# Patient Record
Sex: FEMALE | Race: WHITE | NOT HISPANIC OR LATINO | Employment: OTHER
[De-identification: names, ages, dates, MRNs, and addresses within clinical notes are randomized per-mention and may not be internally consistent; named-entity substitution may affect disease eponyms.]

---

## 2017-04-26 ENCOUNTER — RECORDS - HEALTHEAST (OUTPATIENT)
Dept: ADMINISTRATIVE | Facility: OTHER | Age: 62
End: 2017-04-26

## 2017-04-26 LAB
HPV_EXT - HISTORICAL: NORMAL
PAP SMEAR - HIM PATIENT REPORTED: NORMAL

## 2017-05-19 ENCOUNTER — RECORDS - HEALTHEAST (OUTPATIENT)
Dept: ADMINISTRATIVE | Facility: OTHER | Age: 62
End: 2017-05-19

## 2017-05-26 ENCOUNTER — RECORDS - HEALTHEAST (OUTPATIENT)
Dept: ADMINISTRATIVE | Facility: OTHER | Age: 62
End: 2017-05-26

## 2017-09-20 ENCOUNTER — RECORDS - HEALTHEAST (OUTPATIENT)
Dept: ADMINISTRATIVE | Facility: OTHER | Age: 62
End: 2017-09-20

## 2017-09-20 LAB
HPV_EXT - HISTORICAL: NORMAL
PAP SMEAR - HIM PATIENT REPORTED: NORMAL

## 2017-10-05 ENCOUNTER — RECORDS - HEALTHEAST (OUTPATIENT)
Dept: ADMINISTRATIVE | Facility: OTHER | Age: 62
End: 2017-10-05

## 2019-01-26 ENCOUNTER — RECORDS - HEALTHEAST (OUTPATIENT)
Dept: ADMINISTRATIVE | Facility: OTHER | Age: 64
End: 2019-01-26

## 2019-01-28 ENCOUNTER — RECORDS - HEALTHEAST (OUTPATIENT)
Dept: ADMINISTRATIVE | Facility: OTHER | Age: 64
End: 2019-01-28

## 2019-04-03 ENCOUNTER — RECORDS - HEALTHEAST (OUTPATIENT)
Dept: ADMINISTRATIVE | Facility: OTHER | Age: 64
End: 2019-04-03

## 2019-04-03 LAB — HBA1C MFR BLD: 5.7 % (ref 0–5.6)

## 2019-04-04 ENCOUNTER — AMBULATORY - HEALTHEAST (OUTPATIENT)
Dept: MULTI SPECIALTY CLINIC | Facility: CLINIC | Age: 64
End: 2019-04-04

## 2019-04-04 LAB
HPV_EXT - HISTORICAL: NORMAL
PAP SMEAR - HIM PATIENT REPORTED: NORMAL

## 2019-04-19 ENCOUNTER — RECORDS - HEALTHEAST (OUTPATIENT)
Dept: ADMINISTRATIVE | Facility: OTHER | Age: 64
End: 2019-04-19

## 2019-04-22 ENCOUNTER — AMBULATORY - HEALTHEAST (OUTPATIENT)
Dept: MULTI SPECIALTY CLINIC | Facility: CLINIC | Age: 64
End: 2019-04-22

## 2019-08-30 ENCOUNTER — RECORDS - HEALTHEAST (OUTPATIENT)
Dept: HEALTH INFORMATION MANAGEMENT | Facility: CLINIC | Age: 64
End: 2019-08-30

## 2019-08-30 ENCOUNTER — OFFICE VISIT - HEALTHEAST (OUTPATIENT)
Dept: FAMILY MEDICINE | Facility: CLINIC | Age: 64
End: 2019-08-30

## 2019-08-30 DIAGNOSIS — M85.88 OSTEOPENIA OF LUMBAR SPINE: ICD-10-CM

## 2019-08-30 DIAGNOSIS — R87.810 PAPANICOLAOU SMEAR OF CERVIX WITH POSITIVE HIGH RISK HUMAN PAPILLOMA VIRUS (HPV) TEST: ICD-10-CM

## 2019-08-30 DIAGNOSIS — Z11.59 NEED FOR HEPATITIS C SCREENING TEST: ICD-10-CM

## 2019-08-30 DIAGNOSIS — I10 ESSENTIAL HYPERTENSION: ICD-10-CM

## 2019-08-30 DIAGNOSIS — Z11.4 SCREENING FOR HIV (HUMAN IMMUNODEFICIENCY VIRUS): ICD-10-CM

## 2019-08-30 LAB — HIV 1+2 AB+HIV1 P24 AG SERPL QL IA: NEGATIVE

## 2019-08-30 ASSESSMENT — MIFFLIN-ST. JEOR: SCORE: 1013.85

## 2019-09-02 LAB — HCV AB SERPL QL IA: NEGATIVE

## 2019-09-03 LAB
25(OH)D3 SERPL-MCNC: 34.3 NG/ML (ref 30–80)
25(OH)D3 SERPL-MCNC: 34.3 NG/ML (ref 30–80)

## 2019-09-13 ENCOUNTER — COMMUNICATION - HEALTHEAST (OUTPATIENT)
Dept: FAMILY MEDICINE | Facility: CLINIC | Age: 64
End: 2019-09-13

## 2019-10-04 ENCOUNTER — RECORDS - HEALTHEAST (OUTPATIENT)
Dept: GENERAL RADIOLOGY | Facility: CLINIC | Age: 64
End: 2019-10-04

## 2019-10-04 ENCOUNTER — OFFICE VISIT - HEALTHEAST (OUTPATIENT)
Dept: FAMILY MEDICINE | Facility: CLINIC | Age: 64
End: 2019-10-04

## 2019-10-04 DIAGNOSIS — S99.921A TOE INJURY, RIGHT, INITIAL ENCOUNTER: ICD-10-CM

## 2019-10-04 DIAGNOSIS — Z12.31 ENCOUNTER FOR SCREENING MAMMOGRAM FOR BREAST CANCER: ICD-10-CM

## 2019-10-04 DIAGNOSIS — S99.921A UNSPECIFIED INJURY OF RIGHT FOOT, INITIAL ENCOUNTER: ICD-10-CM

## 2019-10-04 DIAGNOSIS — S92.501A CLOSED FRACTURE OF PHALANX OF RIGHT FOURTH TOE, INITIAL ENCOUNTER: ICD-10-CM

## 2019-10-04 DIAGNOSIS — I10 ESSENTIAL HYPERTENSION: ICD-10-CM

## 2019-10-04 RX ORDER — CALCIUM CARBONATE 500(1250)
500 TABLET ORAL DAILY
Status: SHIPPED | COMMUNITY
Start: 2008-12-17 | End: 2023-09-21

## 2019-10-04 RX ORDER — OMEGA-3-ACID ETHYL ESTERS 1 G/1
2 CAPSULE, LIQUID FILLED ORAL
Status: SHIPPED | COMMUNITY
Start: 2019-10-04 | End: 2021-07-20

## 2019-10-04 ASSESSMENT — ANXIETY QUESTIONNAIRES
4. TROUBLE RELAXING: NOT AT ALL
2. NOT BEING ABLE TO STOP OR CONTROL WORRYING: NOT AT ALL
IF YOU CHECKED OFF ANY PROBLEMS ON THIS QUESTIONNAIRE, HOW DIFFICULT HAVE THESE PROBLEMS MADE IT FOR YOU TO DO YOUR WORK, TAKE CARE OF THINGS AT HOME, OR GET ALONG WITH OTHER PEOPLE: NOT DIFFICULT AT ALL
7. FEELING AFRAID AS IF SOMETHING AWFUL MIGHT HAPPEN: NOT AT ALL
5. BEING SO RESTLESS THAT IT IS HARD TO SIT STILL: NOT AT ALL
GAD7 TOTAL SCORE: 0
3. WORRYING TOO MUCH ABOUT DIFFERENT THINGS: NOT AT ALL
6. BECOMING EASILY ANNOYED OR IRRITABLE: NOT AT ALL
1. FEELING NERVOUS, ANXIOUS, OR ON EDGE: NOT AT ALL

## 2019-10-04 ASSESSMENT — PATIENT HEALTH QUESTIONNAIRE - PHQ9: SUM OF ALL RESPONSES TO PHQ QUESTIONS 1-9: 0

## 2019-10-04 ASSESSMENT — MIFFLIN-ST. JEOR: SCORE: 1030.3

## 2019-10-07 ENCOUNTER — RECORDS - HEALTHEAST (OUTPATIENT)
Dept: ADMINISTRATIVE | Facility: OTHER | Age: 64
End: 2019-10-07

## 2019-10-08 ENCOUNTER — RECORDS - HEALTHEAST (OUTPATIENT)
Dept: MULTI SPECIALTY CLINIC | Facility: CLINIC | Age: 64
End: 2019-10-08

## 2019-10-21 ENCOUNTER — RECORDS - HEALTHEAST (OUTPATIENT)
Dept: ADMINISTRATIVE | Facility: OTHER | Age: 64
End: 2019-10-21

## 2019-11-18 ENCOUNTER — RECORDS - HEALTHEAST (OUTPATIENT)
Dept: ADMINISTRATIVE | Facility: OTHER | Age: 64
End: 2019-11-18

## 2019-11-25 ENCOUNTER — COMMUNICATION - HEALTHEAST (OUTPATIENT)
Dept: FAMILY MEDICINE | Facility: CLINIC | Age: 64
End: 2019-11-25

## 2019-11-27 ENCOUNTER — HOSPITAL ENCOUNTER (OUTPATIENT)
Dept: MAMMOGRAPHY | Facility: CLINIC | Age: 64
Discharge: HOME | End: 2019-11-27
Attending: FAMILY MEDICINE

## 2019-11-27 ENCOUNTER — RECORDS - HEALTHEAST (OUTPATIENT)
Dept: RADIOLOGY | Facility: CLINIC | Age: 64
End: 2019-11-27

## 2019-11-27 ENCOUNTER — RECORDS - HEALTHEAST (OUTPATIENT)
Dept: ADMINISTRATIVE | Facility: OTHER | Age: 64
End: 2019-11-27

## 2019-11-27 DIAGNOSIS — Z12.31 ENCOUNTER FOR SCREENING MAMMOGRAM FOR BREAST CANCER: ICD-10-CM

## 2020-02-24 ENCOUNTER — COMMUNICATION - HEALTHEAST (OUTPATIENT)
Dept: FAMILY MEDICINE | Facility: CLINIC | Age: 65
End: 2020-02-24

## 2020-02-24 DIAGNOSIS — I10 ESSENTIAL HYPERTENSION: ICD-10-CM

## 2020-02-26 ENCOUNTER — RECORDS - HEALTHEAST (OUTPATIENT)
Dept: ADMINISTRATIVE | Facility: OTHER | Age: 65
End: 2020-02-26

## 2020-04-06 ENCOUNTER — COMMUNICATION - HEALTHEAST (OUTPATIENT)
Dept: FAMILY MEDICINE | Facility: CLINIC | Age: 65
End: 2020-04-06

## 2020-04-06 DIAGNOSIS — I10 ESSENTIAL HYPERTENSION: ICD-10-CM

## 2020-08-17 ENCOUNTER — COMMUNICATION - HEALTHEAST (OUTPATIENT)
Dept: FAMILY MEDICINE | Facility: CLINIC | Age: 65
End: 2020-08-17

## 2020-08-17 DIAGNOSIS — I10 ESSENTIAL HYPERTENSION: ICD-10-CM

## 2020-09-25 ENCOUNTER — OFFICE VISIT - HEALTHEAST (OUTPATIENT)
Dept: FAMILY MEDICINE | Facility: CLINIC | Age: 65
End: 2020-09-25

## 2020-09-25 DIAGNOSIS — Z12.4 PAPANICOLAOU SMEAR FOR CERVICAL CANCER SCREENING: ICD-10-CM

## 2020-09-25 DIAGNOSIS — Z00.00 WELCOME TO MEDICARE PREVENTIVE VISIT: ICD-10-CM

## 2020-09-25 DIAGNOSIS — Z23 IMMUNIZATION DUE: ICD-10-CM

## 2020-09-25 DIAGNOSIS — B97.7 HIGH RISK HPV INFECTION: ICD-10-CM

## 2020-09-25 ASSESSMENT — MIFFLIN-ST. JEOR: SCORE: 1093.81

## 2020-09-28 LAB
HPV SOURCE: ABNORMAL
HUMAN PAPILLOMA VIRUS 16 DNA: NEGATIVE
HUMAN PAPILLOMA VIRUS 18 DNA: NEGATIVE
HUMAN PAPILLOMA VIRUS FINAL DIAGNOSIS: ABNORMAL
HUMAN PAPILLOMA VIRUS OTHER HR: POSITIVE
SPECIMEN DESCRIPTION: ABNORMAL

## 2020-09-30 LAB
ATRIAL RATE - MUSE: 56 BPM
ATRIAL RATE - MUSE: 61 BPM
DIASTOLIC BLOOD PRESSURE - MUSE: NORMAL
DIASTOLIC BLOOD PRESSURE - MUSE: NORMAL
INTERPRETATION ECG - MUSE: NORMAL
INTERPRETATION ECG - MUSE: NORMAL
P AXIS - MUSE: 47 DEGREES
P AXIS - MUSE: 58 DEGREES
PR INTERVAL - MUSE: 132 MS
PR INTERVAL - MUSE: 156 MS
QRS DURATION - MUSE: 86 MS
QRS DURATION - MUSE: 88 MS
QT - MUSE: 414 MS
QT - MUSE: 416 MS
QTC - MUSE: 399 MS
QTC - MUSE: 418 MS
R AXIS - MUSE: 2 DEGREES
R AXIS - MUSE: 33 DEGREES
SYSTOLIC BLOOD PRESSURE - MUSE: NORMAL
SYSTOLIC BLOOD PRESSURE - MUSE: NORMAL
T AXIS - MUSE: 30 DEGREES
T AXIS - MUSE: 35 DEGREES
VENTRICULAR RATE- MUSE: 56 BPM
VENTRICULAR RATE- MUSE: 61 BPM

## 2020-10-08 ENCOUNTER — COMMUNICATION - HEALTHEAST (OUTPATIENT)
Dept: ADMINISTRATIVE | Facility: CLINIC | Age: 65
End: 2020-10-08

## 2020-10-08 ENCOUNTER — COMMUNICATION - HEALTHEAST (OUTPATIENT)
Dept: FAMILY MEDICINE | Facility: CLINIC | Age: 65
End: 2020-10-08

## 2020-10-08 ENCOUNTER — AMBULATORY - HEALTHEAST (OUTPATIENT)
Dept: FAMILY MEDICINE | Facility: CLINIC | Age: 65
End: 2020-10-08

## 2020-10-08 DIAGNOSIS — R87.810 CERVICAL HIGH RISK HUMAN PAPILLOMAVIRUS (HPV) DNA TEST POSITIVE: ICD-10-CM

## 2020-10-08 DIAGNOSIS — R87.810 CERVICAL HIGH RISK HPV (HUMAN PAPILLOMAVIRUS) TEST POSITIVE: ICD-10-CM

## 2020-10-15 ENCOUNTER — AMBULATORY - HEALTHEAST (OUTPATIENT)
Dept: OBGYN | Facility: CLINIC | Age: 65
End: 2020-10-15

## 2020-10-15 DIAGNOSIS — Z12.4 ENCOUNTER FOR SCREENING FOR MALIGNANT NEOPLASM OF CERVIX: ICD-10-CM

## 2020-10-15 DIAGNOSIS — R87.615 UNSATISFACTORY CERVICAL PAPANICOLAOU SMEAR: ICD-10-CM

## 2020-10-15 DIAGNOSIS — R87.810 CERVICAL HIGH RISK HPV (HUMAN PAPILLOMAVIRUS) TEST POSITIVE: ICD-10-CM

## 2020-10-23 LAB
BKR LAB AP ABNORMAL BLEEDING: NO
BKR LAB AP BIRTH CONTROL/HORMONES: NORMAL
BKR LAB AP CERVICAL APPEARANCE: NORMAL
BKR LAB AP GYN ADEQUACY: NORMAL
BKR LAB AP GYN INTERPRETATION: NORMAL
BKR LAB AP HPV REFLEX: NORMAL
BKR LAB AP LMP: NORMAL
BKR LAB AP PATIENT STATUS: NORMAL
BKR LAB AP PREVIOUS ABNORMAL: 2017
BKR LAB AP PREVIOUS NORMAL: NORMAL
HIGH RISK?: NO
PATH REPORT.COMMENTS IMP SPEC: NORMAL
RESULT FLAG (HE HISTORICAL CONVERSION): NORMAL

## 2020-10-26 ENCOUNTER — COMMUNICATION - HEALTHEAST (OUTPATIENT)
Dept: OBGYN | Facility: CLINIC | Age: 65
End: 2020-10-26

## 2020-10-26 DIAGNOSIS — R87.810 CERVICAL HIGH RISK HPV (HUMAN PAPILLOMAVIRUS) TEST POSITIVE: ICD-10-CM

## 2020-12-09 ENCOUNTER — HOSPITAL ENCOUNTER (OUTPATIENT)
Dept: MAMMOGRAPHY | Facility: CLINIC | Age: 65
Discharge: HOME | End: 2020-12-09
Attending: FAMILY MEDICINE

## 2020-12-09 DIAGNOSIS — Z12.31 VISIT FOR SCREENING MAMMOGRAM: ICD-10-CM

## 2021-02-15 ENCOUNTER — RECORDS - HEALTHEAST (OUTPATIENT)
Dept: ADMINISTRATIVE | Facility: OTHER | Age: 66
End: 2021-02-15

## 2021-02-15 ENCOUNTER — COMMUNICATION - HEALTHEAST (OUTPATIENT)
Dept: INTERNAL MEDICINE | Facility: CLINIC | Age: 66
End: 2021-02-15

## 2021-02-15 ENCOUNTER — COMMUNICATION - HEALTHEAST (OUTPATIENT)
Dept: FAMILY MEDICINE | Facility: CLINIC | Age: 66
End: 2021-02-15

## 2021-02-15 DIAGNOSIS — I10 ESSENTIAL HYPERTENSION: ICD-10-CM

## 2021-03-04 ENCOUNTER — AMBULATORY - HEALTHEAST (OUTPATIENT)
Dept: LAB | Facility: CLINIC | Age: 66
End: 2021-03-04

## 2021-03-04 DIAGNOSIS — I10 ESSENTIAL HYPERTENSION: ICD-10-CM

## 2021-03-04 LAB
ANION GAP SERPL CALCULATED.3IONS-SCNC: 12 MMOL/L (ref 5–18)
BUN SERPL-MCNC: 25 MG/DL (ref 8–22)
CALCIUM SERPL-MCNC: 9.1 MG/DL (ref 8.5–10.5)
CHLORIDE BLD-SCNC: 106 MMOL/L (ref 98–107)
CO2 SERPL-SCNC: 24 MMOL/L (ref 22–31)
CREAT SERPL-MCNC: 0.91 MG/DL (ref 0.6–1.1)
GFR SERPL CREATININE-BSD FRML MDRD: >60 ML/MIN/1.73M2
GLUCOSE BLD-MCNC: 106 MG/DL (ref 70–125)
POTASSIUM BLD-SCNC: 4.3 MMOL/L (ref 3.5–5)
SODIUM SERPL-SCNC: 142 MMOL/L (ref 136–145)

## 2021-03-08 ENCOUNTER — COMMUNICATION - HEALTHEAST (OUTPATIENT)
Dept: FAMILY MEDICINE | Facility: CLINIC | Age: 66
End: 2021-03-08

## 2021-03-12 ENCOUNTER — COMMUNICATION - HEALTHEAST (OUTPATIENT)
Dept: INTERNAL MEDICINE | Facility: CLINIC | Age: 66
End: 2021-03-12

## 2021-03-12 DIAGNOSIS — I10 ESSENTIAL HYPERTENSION: ICD-10-CM

## 2021-03-12 RX ORDER — LISINOPRIL 20 MG/1
TABLET ORAL
Qty: 90 TABLET | Refills: 2 | Status: SHIPPED | OUTPATIENT
Start: 2021-03-12 | End: 2022-03-17

## 2021-05-26 ASSESSMENT — PATIENT HEALTH QUESTIONNAIRE - PHQ9: SUM OF ALL RESPONSES TO PHQ QUESTIONS 1-9: 0

## 2021-05-28 ASSESSMENT — ANXIETY QUESTIONNAIRES: GAD7 TOTAL SCORE: 0

## 2021-05-31 NOTE — PATIENT INSTRUCTIONS - HE
Shingles vaccine at the pharmacy    Mammogram due in November    I will ask out ob-gyn about whether you need another colposcopy

## 2021-06-02 NOTE — PROGRESS NOTES
Assessment and Plan    1. Toe injury, right, initial encounter  - XR Toe Right 2 or More VWS; Future  I see that she has broken her right fourth proximal phalanx.     2. Closed fracture of phalanx of right fourth toe, initial encounter   I did not see that this went through the joint and advised buddy tapping and hard soled shoes.  Xray later came back noting fracture through MCP joint - I called her back and advised her to be seen at [facility]    3. Essential hypertension  Well controlled on:   - lisinopril (PRINIVIL,ZESTRIL) 20 MG tablet; Take 1 tablet (20 mg total) by mouth daily.  Dispense: 90 tablet; Refill: 1    4. Encounter for screening mammogram for breast cancer  - Mammo Screening Bilateral; Future    Return in about 6 months (around 4/4/2020) for Annual physical, or earlier as needed.    Tasha Moore MD     -------------------------------------------    Chief Complaint   Patient presents with     Toe Injury     Right toe. Patient stubbed toe x 2 days.      Chetna hooked her right 4th toe on a table leg, with immediate onset of swelling and bruising - she assumes she has broken it and is buddy taping - which helps but does not eliminate her pain.  Chetna says that the irony was that shortly before this she and her sister were playing a joke on their brother using the walking boot Chetna had from previous fracture, making him think her younger sister had suffered a recent fracture    She is going on a trip to the [place] next week - this has been planned for a long time - she hopes she will still be able to participate. Curling toes hurts, walking hurts      Hypertension - doesn't need refill of medication until she gets back. She denies chest pains, palpitations or dyspnea  BP Readings from Last 3 Encounters:   10/04/19 128/70   08/30/19 110/70        History Anxiety /depression  No longer takes medications and her questionnaires are normal - I will take this off of her Health Maintenance list    Little  interest or pleasure in doing things: Not at all  Feeling down, depressed, or hopeless: Not at all  Trouble falling or staying asleep, or sleeping too much: Not at all  Feeling tired or having little energy: Not at all  Poor appetite or overeating: Not at all  Feeling bad about yourself - or that you are a failure or have let yourself or your family down: Not at all  Trouble concentrating on things, such as reading the newspaper or watching television: Not at all  Moving or speaking so slowly that other people could have noticed. Or the opposite - being so fidgety or restless that you have been moving around a lot more than usual: Not at all  Thoughts that you would be better off dead, or of hurting yourself in some way: Not at all  PHQ-9 Total Score: 0  If you checked off any problems, how difficult have these problems made it for you to do your work, take care of things at home, or get along with other people?: Not difficult at all    SHERRY 7 Total Score: 0 (10/4/2019  9:00 AM)            Mammogram done    Current Outpatient Medications on File Prior to Visit   Medication Sig Dispense Refill     calcium, as carbonate, (OS-NIESHA) 500 mg calcium (1,250 mg) tablet Take 500 mg by mouth daily.       MULTIVITAMIN ORAL Take by mouth.       omega-3 acid ethyl esters (LOVAZA) 1 gram capsule Take 2 g by mouth.       No current facility-administered medications on file prior to visit.          Health Maintenance Due   Topic Date Due     MAMMOGRAM  1955     COLONOSCOPY  06/02/2004     ZOSTER VACCINES (2 of 3) 12/26/2014     MEDICARE ANNUAL WELLNESS VISIT  06/02/2019     DXA SCAN  06/02/2019       Health Maintenance reviewed   - mammogram ordered today     - in CareEverywhere :   - DXA 4/22/19 - osteopenia of spine   - Colonoscopy 7/31/12   ABOVE sent to abstract    The history section was last reviewed by Lesa Le CMA on Oct 4, 2019.    Social History     Tobacco Use   Smoking Status Former Smoker     Packs/day:  0.10     Start date: 1/1/1978   Smokeless Tobacco Never Used       Social History     Substance and Sexual Activity   Alcohol Use Yes     Alcohol/week: 4.0 standard drinks     Types: 4 Glasses of wine per week     Frequency: 2-3 times a week     Drinks per session: 1 or 2         Vitals:    10/04/19 0853   BP: 128/70   Pulse: 88     Body mass index is 23.24 kg/m .     EXAM:    General appearance - alert, well appearing, and in no distress  Mental status - normal mood, behavior, speech, dress, motor activity, and thought processes  Neck - supple, no significant adenopathy, carotids upstroke normal bilaterally, no bruits  Chest - clear to auscultation, no wheezes, rales or rhonchi, symmetric air entry  Heart - normal rate, regular rhythm, normal S1, S2, no murmurs, rubs, clicks or gallops  Musculoskeletal - slight swelling of right 4th phalanx, tenderness with movement of MCP and over proximal phalanx to palpation; bruising has drifted into the top of her foot  Extremities - peripheral pulses normal

## 2021-06-03 VITALS
DIASTOLIC BLOOD PRESSURE: 70 MMHG | WEIGHT: 123 LBS | HEIGHT: 61 IN | HEART RATE: 88 BPM | SYSTOLIC BLOOD PRESSURE: 128 MMHG | BODY MASS INDEX: 23.22 KG/M2

## 2021-06-03 VITALS — HEIGHT: 61 IN | WEIGHT: 118.5 LBS | BODY MASS INDEX: 22.37 KG/M2

## 2021-06-04 VITALS
SYSTOLIC BLOOD PRESSURE: 122 MMHG | OXYGEN SATURATION: 98 % | DIASTOLIC BLOOD PRESSURE: 74 MMHG | HEART RATE: 70 BPM | WEIGHT: 132 LBS | BODY MASS INDEX: 23.38 KG/M2

## 2021-06-04 VITALS
HEIGHT: 63 IN | WEIGHT: 130 LBS | DIASTOLIC BLOOD PRESSURE: 78 MMHG | BODY MASS INDEX: 23.04 KG/M2 | SYSTOLIC BLOOD PRESSURE: 116 MMHG | OXYGEN SATURATION: 96 % | HEART RATE: 60 BPM | TEMPERATURE: 98.9 F

## 2021-06-06 NOTE — TELEPHONE ENCOUNTER
Refill NOT Given    Refill given per Policy, patient informed they are overdue for Labs   OV 10/4/19    Denisa Benz, [facility] Connection Triage/Med Refill 2/25/2020    Requested Prescriptions   Pending Prescriptions Disp Refills     lisinopriL (PRINIVIL,ZESTRIL) 20 MG tablet [Pharmacy Med Name: LISINOPRIL 20 MG Tablet] 90 tablet 1     Sig: TAKE 1 TABLET EVERY DAY       Ace Inhibitors Refill Protocol Failed - 2/24/2020  1:35 PM        Failed - Serum Potassium in past 12 months     No results found for: LN-POTASSIUM          Failed - Serum Creatinine in past 12 months     No results found for: CREATININE          Passed - PCP or prescribing provider visit in past 12 months       Last office visit with prescriber/PCP: 10/4/2019 Tasha Moore MD OR same dept: 10/4/2019 Tasha Moore MD OR same specialty: 10/4/2019 Tasha Moore MD  Last physical: Visit date not found Last MTM visit: Visit date not found   Next visit within 3 mo: Visit date not found  Next physical within 3 mo: Visit date not found  Prescriber OR PCP: Tasha Moore MD  Last diagnosis associated with med order: 1. Essential hypertension  - lisinopriL (PRINIVIL,ZESTRIL) 20 MG tablet [Pharmacy Med Name: LISINOPRIL 20 MG Tablet]; TAKE 1 TABLET EVERY DAY  Dispense: 90 tablet; Refill: 1    If protocol passes may refill for 12 months if within 3 months of last provider visit (or a total of 15 months).             Passed - Blood pressure filed in past 12 months     BP Readings from Last 1 Encounters:   10/04/19 128/70

## 2021-06-07 NOTE — TELEPHONE ENCOUNTER
Left message to call back for: Patient   Information to relay to patient:  Please schedule a annual exam for after July 6th.    BR, CMA

## 2021-06-07 NOTE — TELEPHONE ENCOUNTER
Last BMP normal 1/26/19 ad [facility]    Last visit 8/30/19    I will refill 3 months - please call her to  schedule annual exam and fasting labs in July

## 2021-06-07 NOTE — TELEPHONE ENCOUNTER
RN cannot approve Refill Request    RN can NOT refill this medication Protocol failed and NO refill given. +    Agustina Wolf, [facility] Connection Triage/Med Refill 4/7/2020    Requested Prescriptions   Pending Prescriptions Disp Refills     lisinopriL (PRINIVIL,ZESTRIL) 20 MG tablet [Pharmacy Med Name: LISINOPRIL 20 MG Tablet] 60 tablet 0     Sig: TAKE 1 TABLET EVERY DAY (DUE FOR LABS AND ANNUAL IN APRIL)       Ace Inhibitors Refill Protocol Failed - 4/6/2020  7:09 PM        Failed - Serum Potassium in past 12 months     No results found for: LN-POTASSIUM          Failed - Serum Creatinine in past 12 months     No results found for: CREATININE          Passed - PCP or prescribing provider visit in past 12 months       Last office visit with prescriber/PCP: 10/4/2019 Tasha Moore MD OR same dept: 10/4/2019 Tasha Moore MD OR same specialty: 10/4/2019 Tasha Moore MD  Last physical: Visit date not found Last MTM visit: Visit date not found   Next visit within 3 mo: Visit date not found  Next physical within 3 mo: Visit date not found  Prescriber OR PCP: Tasha Moore MD  Last diagnosis associated with med order: 1. Essential hypertension  - lisinopriL (PRINIVIL,ZESTRIL) 20 MG tablet [Pharmacy Med Name: LISINOPRIL 20 MG Tablet]; TAKE 1 TABLET EVERY DAY (DUE FOR LABS AND ANNUAL IN APRIL)  Dispense: 60 tablet; Refill: 0    If protocol passes may refill for 12 months if within 3 months of last provider visit (or a total of 15 months).             Passed - Blood pressure filed in past 12 months     BP Readings from Last 1 Encounters:   10/04/19 128/70

## 2021-06-10 NOTE — TELEPHONE ENCOUNTER
RN cannot approve Refill Request    RN can NOT refill this medication Protocol failed and NO refill given. Last office visit: 10/4/2019 Tasha Moore MD Last Physical: Visit date not found Last MTM visit: Visit date not found Last visit same specialty: 10/4/2019 Tasha Moore MD.  Next visit within 3 mo: Visit date not found  Next physical within 3 mo: Visit date not found      Agustina Wolf, [facility] Connection Triage/Med Refill 8/18/2020    Requested Prescriptions   Pending Prescriptions Disp Refills     lisinopriL (PRINIVIL,ZESTRIL) 20 MG tablet [Pharmacy Med Name: LISINOPRIL 20 MG Tablet] 90 tablet 0     Sig: TAKE 1 TABLET EVERY DAY (DUE FOR LABS AND ANNUAL IN APRIL)       Ace Inhibitors Refill Protocol Failed - 8/17/2020  7:06 PM        Failed - Serum Potassium in past 12 months     No results found for: LN-POTASSIUM          Failed - Serum Creatinine in past 12 months     No results found for: CREATININE          Passed - PCP or prescribing provider visit in past 12 months       Last office visit with prescriber/PCP: 10/4/2019 Tasha Moore MD OR same dept: 10/4/2019 Tasha Moore MD OR same specialty: 10/4/2019 Tasha Moore MD  Last physical: Visit date not found Last MTM visit: Visit date not found   Next visit within 3 mo: Visit date not found  Next physical within 3 mo: Visit date not found  Prescriber OR PCP: Tahsa Moore MD  Last diagnosis associated with med order: 1. Essential hypertension  - lisinopriL (PRINIVIL,ZESTRIL) 20 MG tablet [Pharmacy Med Name: LISINOPRIL 20 MG Tablet]; TAKE 1 TABLET EVERY DAY (DUE FOR LABS AND ANNUAL IN APRIL)  Dispense: 90 tablet; Refill: 0    If protocol passes may refill for 12 months if within 3 months of last provider visit (or a total of 15 months).             Passed - Blood pressure filed in past 12 months     BP Readings from Last 1 Encounters:   10/04/19 128/70

## 2021-06-11 NOTE — PROGRESS NOTES
"  Assessment and Plan:     1. Welcome to Medicare preventive visit   - Generally well woman  - Electrocardiogram Perform and Read    2. High risk HPV infection/3. Papanicolaou smear for cervical cancer screening  - Gynecologic Cytology (PAP Smear)    4. Immunization due  - Pneumococcal polysaccharide vaccine 23-valent 1 yo or older, subq/IM  - Influenza,Quad,High Dose,PF 65 YR+        The patient's current medical problems were reviewed.    The following health maintenance schedule was reviewed with the patient and provided in printed form in the after visit summary:   Health Maintenance   Topic Date Due     LIPID  06/02/1999     DXA SCAN  06/02/2019     ZOSTER VACCINES (3 of 3) 10/27/2020     MEDICARE ANNUAL WELLNESS VISIT  09/25/2021     FALL RISK ASSESSMENT  09/25/2021     MAMMOGRAM  11/27/2021     TD 18+ HE  06/27/2022     COLORECTAL CANCER SCREENING  07/31/2022     ADVANCE CARE PLANNING  08/30/2024     HEPATITIS C SCREENING  Completed     PNEUMOCOCCAL IMMUNIZATION 65+ LOW/MEDIUM RISK  Completed     INFLUENZA VACCINE RULE BASED  Completed     HEPATITIS B VACCINES  Aged Out        Subjective:   Chief Complaint: Chetna Villanueva is an 65 y.o. female here for a Welcome to Medicare visit.   HPI:     How are you doing during this  CoVid19 pandemic?   Walking in neighborhood doesn't feel comfortable. Chetna lives in apartment - does walk around [place] with an apartment friend; she also goes with daughter.  She is \"staying at home reading books and gaining weight.\" Also , thinking bout different places where she can exercise during  the winter - bought snowshoes    Wt Readings from Last 3 Encounters:   09/25/20 130 lb (59 kg)   10/04/19 123 lb (55.8 kg)   08/30/19 118 lb 8 oz (53.8 kg)       History of back pain last year - got prednisone dose back - that plus PT worked - no recent episode    Preventive     - last DXA 4/22/19 at [facility] - not link in Health Maintenance  - I will send review  Due next year bone " density - link file   - lipids done 4/3/19 - normal    Review of Systems:   Constitutional: negative for recent illness or change in weight  Eyes: negative for irritation and vision change  Ears, nose, mouth, throat, and face: negative for nasal congestion and sore throat  Respiratory: negative for cough and dyspnea on exertion  Cardiovascular: negative for chest pain and palpitations  Gastrointestinal: negative for abdominal pain and change in bowel habits  Genitourinary:negative for dysuria, frequency and hesitancy  Integument/breast: negative for rash  Hematologic/lymphatic: negative for bleeding and easy bruising  Musculoskeletal:negative for arthralgias and myalgias  Neurological: negative for dizziness, headaches and paresthesia      Patient Care Team:  Tasha Moore MD as PCP - General (Family Medicine)  Tasha Moore MD as Assigned PCP     Patient Active Problem List   Diagnosis     Essential hypertension     Osteopenia     Past Medical History:   Diagnosis Date     Depression      Fracture of fourth toe, right, closed 10/04/2019    proixal phalanx, with plantar displacement     Thoracic compression fracture (H)       Past Surgical History:   Procedure Laterality Date     BREAST CYST ASPIRATION       WISDOM TOOTH EXTRACTION  1972      Family History   Problem Relation Age of Onset     Diabetes type II Mother         on insulin     Dementia Mother         falls, in assisted living     Hypertension Mother      Hypertension Father      Heart attack Father      Coronary Stenting Father      Hypertension Sister         normal stress test     No Medical Problems Brother      No Medical Problems Brother      Brain cancer Sister       Social History     Socioeconomic History     Marital status: Single     Spouse name: Not on file     Number of children: Not on file     Years of education: Not on file     Highest education level: Not on file   Occupational History     Not on file   Social Needs     Financial  "resource strain: Not on file     Food insecurity     Worry: Not on file     Inability: Not on file     Transportation needs     Medical: Not on file     Non-medical: Not on file   Tobacco Use     Smoking status: Former Smoker     Packs/day: 0.10     Start date: 1/1/1978     Smokeless tobacco: Never Used   Substance and Sexual Activity     Alcohol use: Yes     Alcohol/week: 4.0 standard drinks     Types: 4 Glasses of wine per week     Frequency: 2-3 times a week     Drinks per session: 1 or 2     Drug use: Never     Sexual activity: Not Currently     Partners: Male   Lifestyle     Physical activity     Days per week: Not on file     Minutes per session: Not on file     Stress: Not on file   Relationships     Social connections     Talks on phone: Not on file     Gets together: Not on file     Attends Hoahaoism service: Not on file     Active member of club or organization: Not on file     Attends meetings of clubs or organizations: Not on file     Relationship status: Not on file     Intimate partner violence     Fear of current or ex partner: Not on file     Emotionally abused: Not on file     Physically abused: Not on file     Forced sexual activity: Not on file   Other Topics Concern     Not on file   Social History Narrative     Not on file       Current Outpatient Medications   Medication Sig Dispense Refill     calcium, as carbonate, (OS-NIESHA) 500 mg calcium (1,250 mg) tablet Take 500 mg by mouth daily.       lisinopriL (PRINIVIL,ZESTRIL) 20 MG tablet TAKE 1 TABLET EVERY DAY (DUE FOR LABS AND ANNUAL IN APRIL) 31 tablet 0     MULTIVITAMIN ORAL Take by mouth.       omega-3 acid ethyl esters (LOVAZA) 1 gram capsule Take 2 g by mouth.       No current facility-administered medications for this visit.       Objective:   Vital Signs:   Visit Vitals  /78 (Patient Site: Left Arm, Patient Position: Sitting, Cuff Size: Adult Regular)   Pulse 60   Temp 98.9  F (37.2  C) (Oral)   Ht 5' 3\" (1.6 m)   Wt 130 lb (59 kg) "   SpO2 96%   BMI 23.03 kg/m         EKG:  Normal sinus rhythm   Normal ECG   When compared with ECG of 25-SEP-2020 11:26,   Criteria for Lateral infarct are no longer Present   Nonspecific T wave abnormality, improved in Inferior leads     VisionScreening: just had vision exam elsewhere  No exam data present     PHYSICAL EXAM  General appearance - alert, well appearing, and in no distress  Mental status - normal mood, behavior, speech, dress, motor activity, and thought processes  Eyes - pupils equal and reactive, extraocular eye movements intact, funduscopic exam normal, discs flat and sharp  Ears - bilateral TM's and external ear canals normal  Mouth - mucous membranes moist, pharynx normal without lesions  Neck - supple, no significant adenopathy, carotids upstroke normal bilaterally, no bruits, thyroid exam: thyroid is normal in size without nodules or tenderness  Chest - clear to auscultation, no wheezes, rales or rhonchi, symmetric air entry  Heart - normal rate, regular rhythm, normal S1, S2, no murmurs, rubs, clicks or gallops  Abdomen - soft, nontender, nondistended, no masses or organomegaly  Breasts - breasts appear normal, no suspicious masses, no skin or nipple changes or axillary nodes  Neurological - alert, oriented, normal speech, no focal findings or movement disorder noted, DTR's normal and symmetric  Extremities - peripheral pulses normal, no pedal edema, no clubbing or cyanosis  Skin - no rashes or worrisome lesions      Assessment Results 9/25/2020   Activities of Daily Living No help needed   Instrumental Activities of Daily Living No help needed   Mini Cog Total Score 5   Some recent data might be hidden     A Mini Cog score of 0-2 suggests the possibility of dementia, score of 3-5 suggests no dementia    Identified Health Risks:     Information regarding advance directives (living [place]), including where she can download the appropriate form, was provided to the patient via the AVS.

## 2021-06-12 NOTE — PROGRESS NOTES
3/29/10 NIL  4/9/13 NIL  4/6/16 NIL, +HR HPV (not 16/18)  4/26/17 unsatisfactory pap, +HR HPV (not 16/18)  9/20/17 LSIL, +HR HPV (not 16/18)  10/19/17 colpo ECC neg, bx neg but suggestive of HPV effect  4/4/19 unsatisfactory pap, +HR HPV (not 16/18)  9/25/20 unsatisfactory pap, +HR HPV (not 16/18). Plan: colposcopy with Dr Johnson, due before 12/25/20  10/8/20 pt notified  10/15/20 colp visually normal. NIL, +HR HPV (not 16/18). Plan: cotest in 1 year

## 2021-06-12 NOTE — TELEPHONE ENCOUNTER
Pls ask Dr Moore to yasmeen pt and explain her pap results and the next step. Pt was called to schedule a [place] and had not been informed.

## 2021-06-12 NOTE — TELEPHONE ENCOUNTER
Pap tracking nurse to call pt to inform of results and colposcopy recommendation.    Morena Salas RN BSN, Pap Tracking

## 2021-06-12 NOTE — TELEPHONE ENCOUNTER
Ref Range & Units 13d ago   HPV Source  Sure[facility]    HPV16 DNA NEG Negative    HPV18 DNA NEG Negative    Other HR HPV NEG PositiveAbnormal     Final Diagnosis  SEE NOTES

## 2021-06-12 NOTE — PROGRESS NOTES
3/29/10 NIL  4/9/13 NIL  4/6/16 NIL, +HR HPV (not 16/18)  4/26/17 unsatisfactory pap, +HR HPV (not 16/18)  9/20/17 LSIL, +HR HPV (not 16/18)  10/19/17 colpo ECC neg, bx neg but suggestive of HPV effect  4/4/19 unsatisfactory pap, +HR HPV (not 16/18)  9/25/20 unsatisfactory pap, +HR HPV (not 16/18). Plan: colposcopy with Dr Johnson, due before 12/25/20

## 2021-06-12 NOTE — PROGRESS NOTES
Colposcopy Procedure Note    Indications: Pap smear on 9/25/2020 showed scant cellularity with +other HRHPV.  Pertinent past history includes LSIL in 2017.  Colposcopy biopsy at that time was consistent with HPV effect on biopsy at 6:00.  She has had other unsatisfactory Paps in the recent past.  She thinks she had something like a cone biopsy in 1983 or 1984, but she doesn't remember details.    Procedure Details   /74 (Patient Site: Right Arm, Patient Position: Sitting, Cuff Size: Adult Regular)   Pulse 70   Wt 132 lb (59.9 kg)   Body mass index is 23.38 kg/m .    The reason for procedure is explained, and informed consent is obtained.      Speculum is placed in the vagina and visualization of cervix is achieved.  Pap smear was collected.  The cervix and vagina both appear atrophic. The cervix is swabbed with 3% acetic acid solution. Transformation zone is easily seen.  Green filter is applied with no abnormal vessels seen.  Acetowhite epithelium is not seen.  ECC is not done.  Adequate colposcopy.  The patient tolerated the procedure well.    Impression: normal atrophic cervix    Plan: Post procedure instructions are reviewed.  The role of HPV in causing cervical pap smear abnormalities, dysplasia, and cancer is reviewed with the patient.  We discussed the role of the immune system and ways to keep it strong.  She was counseled the likely recommendation will be to return to Dr Moore for a Pap with HPV testing in a year.

## 2021-06-15 NOTE — TELEPHONE ENCOUNTER
Refill Approved    Rx renewed per Medication Renewal Policy. Medication was last renewed on 2/15/21.    Darvin Parnell, [facility] [place] Connection Triage/Med Refill 3/12/2021     Requested Prescriptions   Pending Prescriptions Disp Refills     lisinopriL (PRINIVIL,ZESTRIL) 20 MG tablet [Pharmacy Med Name: LISINOPRIL 20 MG TABLET] 31 tablet 0     Sig: TAKE 1 TABLET EVERY DAY (DUE FOR LABS)       Ace Inhibitors Refill Protocol Passed - 3/12/2021 12:10 AM        Passed - PCP or prescribing provider visit in past 12 months       Last office visit with prescriber/PCP: 10/4/2019 Tasha Moore MD OR same dept: Visit date not found OR same specialty: Visit date not found  Last physical: 9/25/2020 Last MTM visit: Visit date not found   Next visit within 3 mo: Visit date not found  Next physical within 3 mo: Visit date not found  Prescriber OR PCP: Tasha Moore MD  Last diagnosis associated with med order: 1. Essential hypertension  - lisinopriL (PRINIVIL,ZESTRIL) 20 MG tablet [Pharmacy Med Name: LISINOPRIL 20 MG TABLET]; TAKE 1 TABLET EVERY DAY (DUE FOR LABS)  Dispense: 31 tablet; Refill: 0    If protocol passes may refill for 12 months if within 3 months of last provider visit (or a total of 15 months).             Passed - Serum Potassium in past 12 months     Lab Results   Component Value Date    Potassium 4.3 03/04/2021             Passed - Blood pressure filed in past 12 months     BP Readings from Last 1 Encounters:   10/15/20 122/74             Passed - Serum Creatinine in past 12 months     Creatinine   Date Value Ref Range Status   03/04/2021 0.91 0.60 - 1.10 mg/dL Final

## 2021-06-15 NOTE — TELEPHONE ENCOUNTER
My error in not discussing and doiing labs at her annual exam 9/25/20    Will send her message to [facility] in for labs    BP Readings from Last 3 Encounters:   10/15/20 122/74   09/25/20 116/78   10/04/19 128/70

## 2021-06-16 PROBLEM — I10 ESSENTIAL HYPERTENSION: Status: ACTIVE | Noted: 2017-10-05

## 2021-06-16 PROBLEM — M85.80 OSTEOPENIA: Status: ACTIVE | Noted: 2017-05-26

## 2021-06-18 NOTE — PATIENT INSTRUCTIONS - HE
Patient Instructions by Tasha Moore MD at 9/25/2020 10:00 AM     Author: Tasha Moore MD Service: -- Author Type: Physician    Filed: 9/25/2020 10:41 AM Encounter Date: 9/25/2020 Status: Addendum    : Tasha Moore MD (Physician)    Related Notes: Original Note by Tasha Moore MD (Physician) filed at 9/25/2020 10:34 AM         Patient Education   Understanding Advance Care Planning  Advance care planning is the process of deciding ones own future medical care. It helps ensure that if you cant speak for yourself, your wishes can still be carried out. The plan is a series of legal documents that note a persons wishes. The documents vary by state. Advance care planning may be done when a person has a serious illness that is expected to get worse. It may be done before major surgery. And it can help you and your family be prepared in case of a major illness or injury. Advance care planning helps with making decisions at these times.       A health care proxy is a person who acts as the voice of a patient when the patient cant speak for himself or herself. The name of this role varies by state. It may be called a Durable Medical Power of  or Durable Power of  for Healthcare. It may be called an agent, surrogate, or advocate. Or it may be called a representative or decision maker. It is an official duty that is identified by a legal document. The document also varies by state.    Why Is Advance Care Planning Important?  If a person communicates their healthcare wishes:    They will be given medical care that matches their values and goals.    Their family members will not be forced to make decisions in a crisis with no guidance.  Creating a Plan  Making an advance care plan is often done in 3 steps:    Thinking about ones wishes. To create an advance care plan, you should think about what kind of medical treatment you would want if you lose the ability to communicate. Are there any situations  in which you would refuse or stop treatment? Are there therapies you would want or not want? And whom do you want to make decisions for you? There are many places to learn more about how to plan for your care. Ask your doctor or  for resources.    Picking a health care proxy. This means choosing a trusted person to speak for you only when you cant speak for yourself. When you cannot make medical decisions, your proxy makes sure the instructions in your advance care plan are followed. A proxy does not make decisions based on his or her own opinions. They must put aside those opinions and values if needed, and carry out your wishes.    Filling out the legal documents. There are several kinds of legal documents for advance care planning. Each one tells health care providers your wishes. The documents may vary by state. They must be signed and may need to be witnessed or notarized. You can cancel or change them whenever you wish. Depending on your state, the documents may include a Healthcare Proxy form, Living Will, Durable Medical Power of , Advance Directive, or others.  The Familys Role  The best help a family can give is to support their loved ones wishes. Open and honest communication is vital. Family should express any concerns they have about the patients choices while the patient can still make decisions.    6178-4621 [employer]. [address] [place] [address], [place] [address]. All rights reserved. This information is not intended as a substitute for professional medical care. Always follow your healthcare professional's instructions.         Also, [facility] [place] offers a free, downloadable health care directive that allows you to share your treatment choices and personal preferences if you cannot communicate your wishes. It also allows you to appoint another person (called a health care agent) to make health care decisions if you are unable to do so. You can download  an advance directive by going here: [email or web address]     Patient Education   Personalized Prevention Plan  You are due for the preventive services outlined below.  Your care team is available to assist you in scheduling these services.  If you have already completed any of these items, please share that information with your care team to update in your medical record.  Health Maintenance   Topic Date Due   ? LIPID  06/02/1999   ? MEDICARE ANNUAL WELLNESS VISIT  06/02/2019   ? DXA SCAN  06/02/2019   ? INFLUENZA VACCINE RULE BASED (1) 08/01/2020   ? PNEUMOCOCCAL IMMUNIZATION 65+ LOW/MEDIUM RISK (2 of 2 - PPSV23) 08/30/2020   ? ZOSTER VACCINES (3 of 3) 10/27/2020   ? FALL RISK ASSESSMENT  09/25/2021   ? MAMMOGRAM  11/27/2021   ? TD 18+ HE  06/27/2022   ? COLORECTAL CANCER SCREENING  07/31/2022   ? ADVANCE CARE PLANNING  08/30/2024   ? HEPATITIS C SCREENING  Completed   ? HEPATITIS B VACCINES  Aged Out        Patient Education   Understanding Advance Care Planning  Advance care planning is the process of deciding ones own future medical care. It helps ensure that if you cant speak for yourself, your wishes can still be carried out. The plan is a series of legal documents that note a persons wishes. The documents vary by state. Advance care planning may be done when a person has a serious illness that is expected to get worse. It may be done before major surgery. And it can help you and your family be prepared in case of a major illness or injury. Advance care planning helps with making decisions at these times.       A health care proxy is a person who acts as the voice of a patient when the patient cant speak for himself or herself. The name of this role varies by state. It may be called a Durable Medical Power of  or Durable Power of  for Healthcare. It may be called an agent, surrogate, or advocate. Or it may be called a representative or decision maker. It is an  official duty that is identified by a legal document. The document also varies by state.    Why Is Advance Care Planning Important?  If a person communicates their healthcare wishes:    They will be given medical care that matches their values and goals.    Their family members will not be forced to make decisions in a crisis with no guidance.  Creating a Plan  Making an advance care plan is often done in 3 steps:    Thinking about ones wishes. To create an advance care plan, you should think about what kind of medical treatment you would want if you lose the ability to communicate. Are there any situations in which you would refuse or stop treatment? Are there therapies you would want or not want? And whom do you want to make decisions for you? There are many places to learn more about how to plan for your care. Ask your doctor or  for resources.    Picking a health care proxy. This means choosing a trusted person to speak for you only when you cant speak for yourself. When you cannot make medical decisions, your proxy makes sure the instructions in your advance care plan are followed. A proxy does not make decisions based on his or her own opinions. They must put aside those opinions and values if needed, and carry out your wishes.    Filling out the legal documents. There are several kinds of legal documents for advance care planning. Each one tells health care providers your wishes. The documents may vary by state. They must be signed and may need to be witnessed or notarized. You can cancel or change them whenever you wish. Depending on your state, the documents may include a Healthcare Proxy form, Living Will, Durable Medical Power of , Advance Directive, or others.  The Familys Role  The best help a family can give is to support their loved ones wishes. Open and honest communication is vital. Family should express any concerns they have about the patients choices while the patient can  still make decisions.    1020-5262 [employer]. [address] [place] [address], [place] [address]. All rights reserved. This information is not intended as a substitute for professional medical care. Always follow your healthcare professional's instructions.         Also, [facility] offers a free, downloadable health care directive that allows you to share your treatment choices and personal preferences if you cannot communicate your wishes. It also allows you to appoint another person (called a health care agent) to make health care decisions if you are unable to do so. You can download an advance directive by going here: [email or web address]     Patient Education   Personalized Prevention Plan  You are due for the preventive services outlined below.  Your care team is available to assist you in scheduling these services.  If you have already completed any of these items, please share that information with your care team to update in your medical record.  Health Maintenance   Topic Date Due   ? LIPID  06/02/1999   ? MEDICARE ANNUAL WELLNESS VISIT  06/02/2019   ? DXA SCAN  06/02/2019   ? INFLUENZA VACCINE RULE BASED (1) 08/01/2020   ? PNEUMOCOCCAL IMMUNIZATION 65+ LOW/MEDIUM RISK (2 of 2 - PPSV23) 08/30/2020   ? ZOSTER VACCINES (3 of 3) 10/27/2020   ? FALL RISK ASSESSMENT  09/25/2021   ? MAMMOGRAM  11/27/2021   ? TD 18+ HE  06/27/2022   ? COLORECTAL CANCER SCREENING  07/31/2022   ? ADVANCE CARE PLANNING  08/30/2024   ? HEPATITIS C SCREENING  Completed   ? HEPATITIS B VACCINES  Aged Out

## 2021-06-27 ENCOUNTER — HEALTH MAINTENANCE LETTER (OUTPATIENT)
Age: 66
End: 2021-06-27

## 2021-06-27 NOTE — PROGRESS NOTES
Progress Notes by Tasha Moore MD at 8/30/2019  1:00 PM     Author: Tasha Moore MD Service: -- Author Type: Physician    Filed: 9/2/2019  4:21 PM Encounter Date: 8/30/2019 Status: Signed    : Tasha Moore MD (Physician)       Assessment and Plan    1. Essential hypertension  Well controlled on: on lisinopril 20 mg daily - I can refill this when needed .  Note had basic metabolic panel 1/26/19 at [facility]    2. Papanicolaou smear of cervix with positive high risk human papilloma virus (HPV) test  Inadequate pap specimen 4/4/19 with positive HPV not 16 or 18 - I will consult Dr. Johnson about next steps.  She has previously had abnormal pap smears    3. Osteopenia of lumbar spine  On last DXA done 4/22/19  - Vitamin D, Total (25-Hydroxy)    4. Need for hepatitis C screening test  - Hepatitis C Antibody (Anti-HCV)    5. Screening for HIV (human immunodeficiency virus)  - HIV Antigen/Antibody Screening [place]      Patient Instructions   Shingles vaccine at the pharmacy    Mammogram due in November    I will ask out ob-gyn about whether you need another colposcopy          Return in about 8 months (around 4/30/2020) for Annual physical.    Tasha Moore MD     -------------------------------------------    Chief Complaint   Patient presents with   ? Establish Care   ? medication check       Chetna comes to establish care today here at [facility] - this is a convenient location for her.  She had previously been seen in the [facility] system.  Her only ongoing issue is hypertension, for which she takes lisinopril, which controls this well. She walks and lifts weights for exercise    She had a preventive visit in April, before she turned 64 . We took some time to review her preventive care - the below is from [facility] (their equivalent of health maintenance)    Health Maintenance Due Date Last Done Comments   Hep C Screening (Preventive Services) 1955       Zoster (2 of 3)  12/26/2014 10/31/2014     Cervical Cancer Screening Due 04/05/2019 04/04/2019, 09/20/2017, 04/26/2017, Additional history exists     Advanced Directive 06/02/2019       Pneumococcal (1 of 2 - PCV13) 06/02/2019       Influenza (#1) 09/01/2019 10/24/2018, 10/23/1996     Mammogram 11/15/2019 11/15/2018, 10/06/2017, 06/15/2016, Additional history exists     DTaP/Tdap/Td (2 - Td) 06/27/2022 06/27/2012, 01/04/1995     Colonoscopy 07/31/2022 07/31/2012, 07/31/2012     Cholesterol 04/03/2024 04/03/2019, 11/28/2014, 03/29/2010, Additional history exists     HepA Aged Out         - she DOES NOT have depression   - she is fine for the hepatitis C and HIV screens today     - she will plan on getting shingle vaccine through her pharmacy ; she is fine with getting Prevnar vaccine here     - she is fine to do mammogram here - card given     - lipid screening done 4/3/19:  Component Name  4/3/2019 11/28/2014 3/29/2010 3/17/2000     197 213 (H) 184 182   136 100 114 86   63         107         134                  - She had DXA showing osteopenia 4/22/19     - she had an abnormal pap smear which may need follow up     Care Everywhere Result Report  Pap smear  4/4/19  Pap Specimen Adequacy Unsatisfactory for evaluation.     Pap Interpretation Inadequate squamous cellularity.     Gross Description The specimen is received in SurePath fixative and properly labeled. 1 Pap-stained SurePath slide is prepared.     Pap Disclaimer The Pap test is a screening test designed to aid in the detection of cervical cancer and its precursor lesions. It is not a diagnostic procedure and should not be used as the sole means of detecting cervical cancer. Both false-positive and false-negative reports may occur.          HPV with 16 18 GenotypingResulted: 4/12/2019 7:10 AM  [facility]  Component Name Value Ref Range   HPV High Risk Type 16 PCR Not Detected Not detected    HPV High Risk Type 18 PCR Not Detected Not Detected    HPV High Risk Other Than  "16/18 Detected (A) Not detected    Specimen Collected on   Cervical Broom - Cervix/Endocervix 4/4/2019 6:41 AM   Result Narrative   The [facility] HPV test is a qualitative in vitro test for the detection of Human Papillomavirus in SurePath patient specimens. The test utilizes amplification of target DNA by Polymerase Chain Reaction (PCR) and nucleic acid hybridization for the detection of 14 high-risk (HR) HPV types. The assay tests for high risk types (16, 18, 31, 33, 35, 39, 45, 51, 52, 56, 58, 59, 66, and 68).      History: From visit on 6/24/19: \"She has a hx. 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 COLP no dysplasia            4/2019 unsatisfactory pap test ( limited cellularity), HPV High Risk Other Than 16/18 detected   63 y.o.            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 Her Mom lives in [place] ([place] used to live in [place]) and is in and out of the hospital. She has been  twice and has no current partner; she is amiable with previous           Current Outpatient Medications on File Prior to Visit   Medication Sig Dispense Refill   ? lisinopril (PRINIVIL,ZESTRIL) 20 MG tablet TAKE 1 TABLET BY MOUTH DAILY.     ? MULTIVITAMIN ORAL Take by mouth.       No current facility-administered medications on file prior to visit.          Health Maintenance Due   Topic Date Due   ? DEPRESSION FOLLOW UP  1955   ? HEPATITIS C SCREENING  1955   ? ZOSTER VACCINES (2 of 3) 12/26/2014   ? " MEDICARE ANNUAL WELLNESS VISIT  06/02/2019   ? DXA SCAN  06/02/2019   ? PNEUMOCOCCAL POLYSACCHARIDE VACCINE AGE 65 AND OVER  06/02/2019   ? INFLUENZA VACCINE RULE BASED (1) 08/01/2019     Health Maintenance reviewed - .    The history section was last reviewed by Tasha Moore MD on Aug 30, 2019.    Social History     Tobacco Use   Smoking Status Former Smoker   ? Packs/day: 0.10   ? Start date: 1/1/1978   Smokeless Tobacco Never Used       Social History     Substance and Sexual Activity   Alcohol Use Yes   ? Alcohol/week: 2.4 oz   ? Types: 4 Glasses of wine per week   ? Frequency: 2-3 times a week   ? Drinks per session: 1 or 2         Vitals:    08/30/19 1306   BP: 110/70   Pulse: 70   SpO2: 98%     Body mass index is 22.21 kg/m .     EXAM:    General appearance - alert, well appearing, and in no distress  Mental status - normal mood, behavior, speech, dress, motor activity, and thought processes  Chest - clear to auscultation, no wheezes, rales or rhonchi, symmetric air entry  Heart - normal rate, regular rhythm, normal S1, S2, no murmurs, rubs, clicks or gallops, normal bilateral carotid upstroke without bruits, normal peripheral pulses

## 2021-07-14 ENCOUNTER — TELEPHONE (OUTPATIENT)
Dept: FAMILY MEDICINE | Facility: CLINIC | Age: 66
End: 2021-07-14

## 2021-07-14 NOTE — PROGRESS NOTES
This patient is coming in on 7/19 for labs prior to her visit with you on 7/20. We need orders entered. Please advise/enter orders. Thank you.

## 2021-07-15 NOTE — TELEPHONE ENCOUNTER
At the moment, I cannot see anything that she is due for.  Maybe there is something on the [facility] side that we cannot see (unalbe to log on at the moment)

## 2021-07-17 ASSESSMENT — ACTIVITIES OF DAILY LIVING (ADL): CURRENT_FUNCTION: NO ASSISTANCE NEEDED

## 2021-07-20 ENCOUNTER — OFFICE VISIT (OUTPATIENT)
Dept: FAMILY MEDICINE | Facility: CLINIC | Age: 66
End: 2021-07-20
Payer: COMMERCIAL

## 2021-07-20 VITALS
HEART RATE: 80 BPM | BODY MASS INDEX: 23.15 KG/M2 | OXYGEN SATURATION: 94 % | HEIGHT: 62 IN | SYSTOLIC BLOOD PRESSURE: 130 MMHG | WEIGHT: 125.8 LBS | DIASTOLIC BLOOD PRESSURE: 70 MMHG

## 2021-07-20 DIAGNOSIS — Z78.0 MENOPAUSE: ICD-10-CM

## 2021-07-20 DIAGNOSIS — Z00.00 ENCOUNTER FOR ANNUAL WELLNESS VISIT (AWV) IN MEDICARE PATIENT: Primary | ICD-10-CM

## 2021-07-20 DIAGNOSIS — M85.88 OSTEOPENIA OF LUMBAR SPINE: ICD-10-CM

## 2021-07-20 PROCEDURE — 99397 PER PM REEVAL EST PAT 65+ YR: CPT | Performed by: FAMILY MEDICINE

## 2021-07-20 RX ORDER — ASCORBIC ACID 100 MG
TABLET,CHEWABLE ORAL
COMMUNITY
End: 2023-09-21

## 2021-07-20 ASSESSMENT — ACTIVITIES OF DAILY LIVING (ADL): CURRENT_FUNCTION: NO ASSISTANCE NEEDED

## 2021-07-20 ASSESSMENT — MIFFLIN-ST. JEOR: SCORE: 1058.88

## 2021-07-20 NOTE — PROGRESS NOTES
"  ASSESSMENT / PLAN:     Encounter for annual wellness visit (AWV) in Medicare patient  Generally well woman    Osteopenia of lumbar spine/  Menopause  - DX Hip/Pelvis/Spine      Patient has been advised of split billing requirements and indicates understanding: Yes  COUNSELING:  Reviewed preventive health counseling, as reflected in patient instructions       Vision screening       Dental care    Estimated body mass index is 23.01 kg/m  as calculated from the following:    Height as of this encounter: 1.575 m (5' 2\").    Weight as of this encounter: 57.1 kg (125 lb 12.8 oz).      She reports that she has quit smoking. She started smoking about 43 years ago. She smoked 0.10 packs per day. She has never used smokeless tobacco.      Appropriate preventive services were discussed with this patient, including applicable screening as appropriate for cardiovascular disease, diabetes, osteopenia/osteoporosis, and glaucoma.  As appropriate for age/gender, discussed screening for colorectal cancer, prostate cancer, breast cancer, and cervical cancer. Checklist reviewing preventive services available has been given to the patient.    Reviewed patients plan of care and provided an AVS. The Basic Care Plan (routine screening as documented in Health Maintenance) for Chetna meets the Care Plan requirement. This Care Plan has been established and reviewed with the Patient.  Tasha Moore MD  [facility]        SUBJECTIVE:   Chetna Villanueva is a 66 year old female who presents for Preventive Visit.      Patient has been advised of split billing requirements and indicates understanding: Yes   Are you in the first 12 months of your Medicare coverage?  No    Healthy Habits:     In general, how would you rate your overall health?  Good    Frequency of exercise:  4-5 days/week    Duration of exercise:  30-45 minutes    Do you usually eat at least 4 servings of fruit and vegetables a day, include " "whole grains    & fiber and avoid regularly eating high fat or \"junk\" foods?  Yes    Taking medications regularly:  Yes    Medication side effects:  None    Ability to successfully perform activities of daily living:  No assistance needed    Home Safety:  No safety concerns identified    Hearing Impairment:  No hearing concerns    In the past 6 months, have you been bothered by leaking of urine?  No    In general, how would you rate your overall mental or emotional health?  Excellent      PHQ-2 Total Score: 0    Additional concerns today:  No    Do you feel safe in your environment? Yes    Have you ever done Advance Care Planning? (For example, a Health Directive, POLST, or a discussion with a medical provider or your loved ones about your wishes): Yes, patient states has an Advance Care Planning document and will bring a copy to the clinic.      Fall risk   Fallen 2 or more times in the past year?: No  Any fall with injury in the past year?: No    Cognitive Screening   1) Repeat 3 items (Leader, Season, Table)    2) Clock draw: NORMAL  3) 3 item recall: Recalls 3 objects  Results: 3 items recalled: COGNITIVE IMPAIRMENT LESS LIKELY    Mini-CogTM Copyright S Jamir. Licensed by the author for use in [facility]; reprinted with permission (vimal@[facility]). All rights reserved.        Reviewed and updated as needed this visit by clinical staff  Tobacco  Allergies  Meds              Lexus says that she has had a few appointment in [place] with PHYSICAL THERAPY for back pain. After this she joined a friend who is doing online fitness with a . She attend Mond/Wed/Fri and has recently graduate to Saturday workout.  She also got a rowing machine for her birthday      Reviewed and updated as needed this visit by Provider                Social History     Tobacco Use     Smoking status: Former Smoker     Packs/day: 0.10     Start date: 1/1/1978     Smokeless tobacco: Never Used   Substance Use Topics " "    Alcohol use: Yes     Alcohol/week: 4.0 standard drinks         Alcohol Use 7/17/2021   Prescreen: >3 drinks/day or >7 drinks/week? No       Started living will     Current providers sharing in care for this patient include:   Patient Care Team:  Tasha Moore MD as PCP - General  Tasha Moore MD as Assigned PCP    The following health maintenance items are reviewed in Epic and correct as of today:  Health Maintenance Due   Topic Date Due     DEXA  04/22/2021     PAP FOLLOW-UP  10/15/2021     HPV FOLLOW-UP  10/15/2021     Hypertension  Well controlled on lisinopril 20 mg daily.  Last basic metabolic panel in March of this year  BP Readings from Last 6 Encounters:   07/20/21 130/70   10/15/20 122/74   09/25/20 116/78   10/04/19 128/70         Any new diagnosis of family breast, ovarian, or bowel cancer? NO    Review of Systems  Constitutional: negative for recent illness or change in weight  Eyes: negative for irritation and vision change  Ears, nose, mouth, throat, and face: negative for nasal congestion and sore throat  Respiratory: negative for cough and dyspnea on exertion  Cardiovascular: negative for chest pain and palpitations  Gastrointestinal: negative for abdominal pain and change in bowel habits  Genitourinary:negative for dysuria, frequency and hesitancy  Integument/breast: negative for rash  Hematologic/lymphatic: negative for bleeding and easy bruising  Musculoskeletal:negative for arthralgias and myalgias  Neurological: negative for dizziness, headaches and paresthesia        OBJECTIVE:   /70 (BP Location: Left arm, Patient Position: Sitting, Cuff Size: Adult Small)   Pulse 80   Ht 1.575 m (5' 2\")   Wt 57.1 kg (125 lb 12.8 oz)   SpO2 94%   BMI 23.01 kg/m   Estimated body mass index is 23.01 kg/m  as calculated from the following:    Height as of this encounter: 1.575 m (5' 2\").    Weight as of this encounter: 57.1 kg (125 lb 12.8 oz).  Physical Exam  General " appearance - alert, well appearing, and in no distress  Mental status - normal mood, behavior, speech, dress, motor activity, and thought processes  Eyes - pupils equal and reactive, extraocular eye movements intact, funduscopic exam normal, discs flat and sharp  Ears - bilateral TM's and external ear canals normal  Mouth - mucous membranes moist, pharynx normal without lesions  Neck - supple, no significant adenopathy, carotids upstroke normal bilaterally, no bruits, thyroid exam: thyroid is normal in size without nodules or tenderness  Chest - clear to auscultation, no wheezes, rales or rhonchi, symmetric air entry  Heart - normal rate, regular rhythm, normal S1, S2, no murmurs, rubs, clicks or gallops  Abdomen - soft, nontender, nondistended, no masses or organomegaly  Breasts - breasts appear normal, no suspicious masses, no skin or nipple changes or axillary nodes  Neurological - alert, oriented, normal speech, no focal findings or movement disorder noted, DTR's normal and symmetric  Extremities - peripheral pulses normal, no pedal edema, no clubbing or cyanosis  Skin - no rashes or worrisome lesions; uniformly tan (does tanning johnson - discussed)

## 2021-07-20 NOTE — PATIENT INSTRUCTIONS
" Our goal is to discuss this at least every five years with all patients starting at age 18.  Advanced directives are a document that lets us know who talks for you and what your desires are in a medical situation where you are unable to talk for yourself    Here is a useful web site that includes a link to the honoring choices form (under how do I document my choices?):     [email or web address]://[email or web address]-choices    The three must haves for this form are  1) who speaks for you (health care agent if you cannot speak for yourself, and what 'brady' they have  2) what interventions you want if yo are permanently unconscious  3) making this legal - either by notary, or by two signatures of witnesses, neither of who is your health care agent    There are other parts to the form that are optional    No rush for this! Just something we are supposed to discuss every so often.    If you complete the form, and can make a pdf of it, you can send me a message with that pdf attached and it will become part of your chart here; if you cannot make an electronic copy to send by [employer], mailing the document is also fine.  You might also consider putting it on your fridge under \"Emergency Instructions.,\" as well as having a copy for yourself and your family.    Please let me know if you have any questions.    Tasha Moore MD    "

## 2021-07-21 ENCOUNTER — MYC MEDICAL ADVICE (OUTPATIENT)
Dept: FAMILY MEDICINE | Facility: CLINIC | Age: 66
End: 2021-07-21

## 2021-07-27 ENCOUNTER — ANCILLARY PROCEDURE (OUTPATIENT)
Dept: BONE DENSITY | Facility: CLINIC | Age: 66
End: 2021-07-27
Attending: FAMILY MEDICINE
Payer: COMMERCIAL

## 2021-07-27 DIAGNOSIS — Z78.0 MENOPAUSE: ICD-10-CM

## 2021-07-27 DIAGNOSIS — M85.88 OSTEOPENIA OF LUMBAR SPINE: ICD-10-CM

## 2021-07-27 PROCEDURE — 77080 DXA BONE DENSITY AXIAL: CPT | Performed by: INTERNAL MEDICINE

## 2021-08-03 PROBLEM — S22.080A COMPRESSION FRACTURE OF T12 VERTEBRA (H): Status: RESOLVED | Noted: 2017-04-10 | Resolved: 2019-08-30

## 2021-09-30 ENCOUNTER — PATIENT OUTREACH (OUTPATIENT)
Dept: FAMILY MEDICINE | Facility: CLINIC | Age: 66
End: 2021-09-30
Payer: COMMERCIAL

## 2021-09-30 DIAGNOSIS — R87.810 CERVICAL HIGH RISK HPV (HUMAN PAPILLOMAVIRUS) TEST POSITIVE: ICD-10-CM

## 2021-12-09 ENCOUNTER — OFFICE VISIT (OUTPATIENT)
Dept: OBGYN | Facility: CLINIC | Age: 66
End: 2021-12-09
Payer: COMMERCIAL

## 2021-12-09 VITALS
SYSTOLIC BLOOD PRESSURE: 126 MMHG | WEIGHT: 124 LBS | HEART RATE: 72 BPM | HEIGHT: 62 IN | DIASTOLIC BLOOD PRESSURE: 72 MMHG | BODY MASS INDEX: 22.82 KG/M2

## 2021-12-09 DIAGNOSIS — Z12.4 CERVICAL CANCER SCREENING: Primary | ICD-10-CM

## 2021-12-09 PROCEDURE — 87624 HPV HI-RISK TYP POOLED RSLT: CPT | Performed by: OBSTETRICS & GYNECOLOGY

## 2021-12-09 PROCEDURE — 99213 OFFICE O/P EST LOW 20 MIN: CPT | Performed by: OBSTETRICS & GYNECOLOGY

## 2021-12-09 PROCEDURE — G0123 SCREEN CERV/VAG THIN LAYER: HCPCS | Performed by: OBSTETRICS & GYNECOLOGY

## 2021-12-09 RX ORDER — CALCIUM CARBONATE 500 MG/1
1 TABLET, CHEWABLE ORAL 2 TIMES DAILY
COMMUNITY
End: 2022-09-18

## 2021-12-09 ASSESSMENT — MIFFLIN-ST. JEOR: SCORE: 1050.71

## 2021-12-09 NOTE — PROGRESS NOTES
"S: The patient is here in follow up of her abnormal Pap from last year.  See note from 10/15/2020.  Her Pap on 9/25/2020 showed scant cellularity with +other HRHPV.  Colposcopy was normal.  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  Colposcopy biopsy at that time was consistent with HPV changes.    Outpatient Medications Prior to Visit   Medication Sig Dispense Refill     Ascorbic Acid (VITAMIN C) 100 MG CHEW        calcium carbonate (TUMS) 500 MG chewable tablet Take 1 chew tab by mouth 2 times daily       calcium, as carbonate, (OS-NIESHA) 500 mg calcium (1,250 mg) tablet [CALCIUM, AS CARBONATE, (OS-NIESHA) 500 MG CALCIUM (1,250 MG) TABLET] Take 500 mg by mouth daily.       lisinopriL (PRINIVIL,ZESTRIL) 20 MG tablet [LISINOPRIL (PRINIVIL,ZESTRIL) 20 MG TABLET] TAKE 1 TABLET EVERY DAY (DUE FOR LABS) 90 tablet 2     MULTIVITAMIN ORAL [MULTIVITAMIN ORAL] Take by mouth.       No facility-administered medications prior to visit.       Patient is allergic to doxycycline and tetracycline.    O:  /72 (BP Location: Right arm, Patient Position: Sitting, Cuff Size: Adult Regular)   Pulse 72   Ht 1.575 m (5' 2\")   Wt 56.2 kg (124 lb)           Body mass index is 22.68 kg/m .    General: WN/WD WF, NAD  Pelvic: EG/BUS no lesions, no skin change   Vagina no lesions, no discharge, atrophic   Cervix no lesions, no cervical motion tenderness   Perineum no lesions   Rectal deferred    Assessment: 66 year old SWF P1 with a history of abnormal Paps.    Plan:  Pap smear was done today.  If the Pap is normal and still +other HRHPV, she doesn't need a colposcopy.  If the Pap is abnormal, she was counseled that she does need a colposcopy.  If both are normal, we discussed that she needs at least 2 annual normal results to be able to space out/stop Paps.  Questions were answered to the best of my ability.    17 minutes spent on the date of the encounter doing chart review, review of test results, patient visit and documentation  "

## 2021-12-15 LAB
HUMAN PAPILLOMA VIRUS 16 DNA: NEGATIVE
HUMAN PAPILLOMA VIRUS 18 DNA: NEGATIVE
HUMAN PAPILLOMA VIRUS FINAL DIAGNOSIS: ABNORMAL
HUMAN PAPILLOMA VIRUS OTHER HR: POSITIVE

## 2021-12-21 ENCOUNTER — PATIENT OUTREACH (OUTPATIENT)
Dept: OBGYN | Facility: CLINIC | Age: 66
End: 2021-12-21
Payer: COMMERCIAL

## 2021-12-21 LAB
BKR LAB AP GYN ADEQUACY: NORMAL
BKR LAB AP GYN INTERPRETATION: NORMAL
BKR LAB AP HPV REFLEX: NORMAL
BKR LAB AP PREVIOUS ABNL DX: NORMAL
BKR LAB AP PREVIOUS ABNORMAL: NORMAL
PATH REPORT.COMMENTS IMP SPEC: NORMAL
PATH REPORT.COMMENTS IMP SPEC: NORMAL
PATH REPORT.RELEVANT HX SPEC: NORMAL

## 2021-12-21 NOTE — LETTER
December 21, 2021      Chetna Villanueva  [address]   [place] [address]        Dear ,    This letter is regarding your recent Pap smear and Human Papillomavirus (HPV) test.    Your results showed a normal Pap smear and HPV positive.     About 80 percent of women have been exposed to HPV throughout their lifetime. There is no medication for the treatment of HPV. Typically, your own immune system gets rid of the virus before it does harm. HPV is spread by direct skin-to-skin contact, including sexual intercourse, oral sex, anal sex, or any other contact involving the genital area (example: hand to genital contact). It is not possible to become infected with HPV by touching an object, such as a toilet seat. Most people who are infected with HPV have no signs or symptoms.    Things that you can do to boost your immune system and help your body get rid of HPV: get plenty of rest, eat a well-balanced diet of healthy foods, exercise regularly, decrease stress and if you are a smoker, stop smoking.    It is recommended that you have your next Pap smear and Human Papillomavirus (HPV) test in 1 year.    If you have additional questions regarding this result, please contact our Registered Nurse, Morena, at [phone].      Sincerely,    Your [facility] Care Team

## 2022-02-04 ENCOUNTER — MYC MEDICAL ADVICE (OUTPATIENT)
Dept: FAMILY MEDICINE | Facility: CLINIC | Age: 67
End: 2022-02-04
Payer: COMMERCIAL

## 2022-03-17 DIAGNOSIS — I10 ESSENTIAL HYPERTENSION: ICD-10-CM

## 2022-03-17 RX ORDER — LISINOPRIL 20 MG/1
TABLET ORAL
Qty: 90 TABLET | Refills: 2 | Status: SHIPPED | OUTPATIENT
Start: 2022-03-17 | End: 2022-12-23

## 2022-03-17 NOTE — TELEPHONE ENCOUNTER
"Routing refill request to provider for review/approval because:  Labs not current:  Cr    Last Written Prescription Date:  3/12/2021  Last Fill Quantity: 90,  # refills: 2   Last office visit provider:  7/20/2021     Requested Prescriptions   Pending Prescriptions Disp Refills     lisinopril (ZESTRIL) 20 MG tablet 90 tablet 2       ACE Inhibitors (Including Combos) Protocol Failed - 3/17/2022 11:23 AM        Failed - Normal serum creatinine on file in past 12 months     Recent Labs   Lab Test 03/04/21  0959   CR 0.91       Ok to refill medication if creatinine is low          Failed - Normal serum potassium on file in past 12 months     Recent Labs   Lab Test 03/04/21  0959   POTASSIUM 4.3             Passed - Blood pressure under 140/90 in past 12 months     BP Readings from Last 3 Encounters:   12/09/21 126/72   07/20/21 130/70   10/15/20 122/74                 Passed - Recent (12 mo) or future (30 days) visit within the authorizing provider's specialty     Patient has had an office visit with the authorizing provider or a provider within the authorizing providers department within the previous 12 mos or has a future within next 30 days. See \"Patient Info\" tab in inbasket, or \"Choose Columns\" in Meds & Orders section of the refill encounter.              Passed - Medication is active on med list        Passed - Patient is age 18 or older        Passed - No active pregnancy on record        Passed - No positive pregnancy test within past 12 months             Lynette Capone RN 03/17/22 3:26 PM  "

## 2022-09-09 ASSESSMENT — ENCOUNTER SYMPTOMS
HEMATOCHEZIA: 0
PARESTHESIAS: 0
COUGH: 0
CONSTIPATION: 0
BREAST MASS: 0
HEADACHES: 0
ABDOMINAL PAIN: 0
WEAKNESS: 0
DIZZINESS: 0
FEVER: 0
NAUSEA: 0
ARTHRALGIAS: 0
JOINT SWELLING: 0
HEARTBURN: 0
HEMATURIA: 0
EYE PAIN: 0
SHORTNESS OF BREATH: 0
CHILLS: 0
NERVOUS/ANXIOUS: 0
DIARRHEA: 0
FREQUENCY: 0
MYALGIAS: 0
DYSURIA: 0
SORE THROAT: 0
PALPITATIONS: 0

## 2022-09-09 ASSESSMENT — ACTIVITIES OF DAILY LIVING (ADL): CURRENT_FUNCTION: NO ASSISTANCE NEEDED

## 2022-09-16 ENCOUNTER — OFFICE VISIT (OUTPATIENT)
Dept: FAMILY MEDICINE | Facility: CLINIC | Age: 67
End: 2022-09-16
Payer: COMMERCIAL

## 2022-09-16 VITALS
WEIGHT: 120 LBS | SYSTOLIC BLOOD PRESSURE: 124 MMHG | HEIGHT: 61 IN | HEART RATE: 60 BPM | DIASTOLIC BLOOD PRESSURE: 62 MMHG | BODY MASS INDEX: 22.66 KG/M2 | OXYGEN SATURATION: 98 %

## 2022-09-16 DIAGNOSIS — I10 ESSENTIAL HYPERTENSION: ICD-10-CM

## 2022-09-16 DIAGNOSIS — Z00.00 ENCOUNTER FOR MEDICARE ANNUAL WELLNESS EXAM: Primary | ICD-10-CM

## 2022-09-16 DIAGNOSIS — Z12.11 SCREEN FOR COLON CANCER: ICD-10-CM

## 2022-09-16 DIAGNOSIS — Z23 HIGH PRIORITY FOR 2019-NCOV VACCINE: ICD-10-CM

## 2022-09-16 DIAGNOSIS — Z12.31 VISIT FOR SCREENING MAMMOGRAM: ICD-10-CM

## 2022-09-16 DIAGNOSIS — R87.810 CERVICAL HIGH RISK HPV (HUMAN PAPILLOMAVIRUS) TEST POSITIVE: ICD-10-CM

## 2022-09-16 LAB
ANION GAP SERPL CALCULATED.3IONS-SCNC: 14 MMOL/L (ref 7–15)
BUN SERPL-MCNC: 28.7 MG/DL (ref 8–23)
CALCIUM SERPL-MCNC: 9.6 MG/DL (ref 8.8–10.2)
CHLORIDE SERPL-SCNC: 102 MMOL/L (ref 98–107)
CREAT SERPL-MCNC: 0.93 MG/DL (ref 0.51–0.95)
DEPRECATED HCO3 PLAS-SCNC: 23 MMOL/L (ref 22–29)
GFR SERPL CREATININE-BSD FRML MDRD: 67 ML/MIN/1.73M2
GLUCOSE SERPL-MCNC: 78 MG/DL (ref 70–99)
POTASSIUM SERPL-SCNC: 4.1 MMOL/L (ref 3.4–5.3)
SODIUM SERPL-SCNC: 139 MMOL/L (ref 136–145)

## 2022-09-16 PROCEDURE — 36415 COLL VENOUS BLD VENIPUNCTURE: CPT | Performed by: FAMILY MEDICINE

## 2022-09-16 PROCEDURE — 91313 COVID-19,PF,MODERNA BIVALENT: CPT | Performed by: FAMILY MEDICINE

## 2022-09-16 PROCEDURE — 99213 OFFICE O/P EST LOW 20 MIN: CPT | Mod: 25 | Performed by: FAMILY MEDICINE

## 2022-09-16 PROCEDURE — G0123 SCREEN CERV/VAG THIN LAYER: HCPCS | Performed by: FAMILY MEDICINE

## 2022-09-16 PROCEDURE — 90662 IIV NO PRSV INCREASED AG IM: CPT | Performed by: FAMILY MEDICINE

## 2022-09-16 PROCEDURE — G0439 PPPS, SUBSEQ VISIT: HCPCS | Performed by: FAMILY MEDICINE

## 2022-09-16 PROCEDURE — 0134A COVID-19,PF,MODERNA BIVALENT: CPT | Performed by: FAMILY MEDICINE

## 2022-09-16 PROCEDURE — 87624 HPV HI-RISK TYP POOLED RSLT: CPT | Performed by: FAMILY MEDICINE

## 2022-09-16 PROCEDURE — G0008 ADMIN INFLUENZA VIRUS VAC: HCPCS | Performed by: FAMILY MEDICINE

## 2022-09-16 PROCEDURE — 80048 BASIC METABOLIC PNL TOTAL CA: CPT | Performed by: FAMILY MEDICINE

## 2022-09-16 RX ORDER — AMOXICILLIN 500 MG
CAPSULE ORAL
COMMUNITY
End: 2023-09-21

## 2022-09-16 ASSESSMENT — ENCOUNTER SYMPTOMS
HEMATOCHEZIA: 0
EYE PAIN: 0
HEARTBURN: 0
PARESTHESIAS: 0
SHORTNESS OF BREATH: 0
FREQUENCY: 0
DIARRHEA: 0
SORE THROAT: 0
DIZZINESS: 0
NERVOUS/ANXIOUS: 0
MYALGIAS: 0
HEADACHES: 0
DYSURIA: 0
JOINT SWELLING: 0
NAUSEA: 0
ABDOMINAL PAIN: 0
WEAKNESS: 0
HEMATURIA: 0
CONSTIPATION: 0
ARTHRALGIAS: 0
COUGH: 0
PALPITATIONS: 0
CHILLS: 0
FEVER: 0
BREAST MASS: 0

## 2022-09-16 ASSESSMENT — ACTIVITIES OF DAILY LIVING (ADL): CURRENT_FUNCTION: NO ASSISTANCE NEEDED

## 2022-09-16 NOTE — PATIENT INSTRUCTIONS
Make sure  [facility] is covered by your insurance(for colonoscopy)  Patient Education   Personalized Prevention Plan  You are due for the preventive services outlined below.  Your care team is available to assist you in scheduling these services.  If you have already completed any of these items, please share that information with your care team to update in your medical record.  Health Maintenance Due   Topic Date Due     Diptheria Tetanus Pertussis (DTAP/TDAP/TD) Vaccine (2 - Td or Tdap) 06/27/2022     ANNUAL REVIEW OF HM ORDERS  07/20/2022     Colorectal Cancer Screening  07/31/2022     PAP  12/09/2022     HPV Follow Up  12/09/2022

## 2022-09-16 NOTE — PROGRESS NOTES
"  ASSESSMENT / PLAN:   Chetna was seen today for wellness visit and imm/inj.    Diagnoses and all orders for this visit:    Encounter for Medicare annual wellness exam  controlled chronic issues with no change in management or complex decision making today    Essential hypertension  -     Basic metabolic panel  (Ca, Cl, CO2, Creat, Gluc, K, Na, BUN)    Cervical high risk HPV (human papillomavirus) test positive  -     Pap Screen with HPV - recommended age 30 - 65 years    Screen for colon cancer  -     Colonoscopy Screening  Referral; Future    High priority for 2019-nCoV vaccine  -     COVID-19,PF,MODERNA BIVALENT 18+Yrs    Visit for screening mammogram  -     MA Screening Digital Bilateral; Future    Other orders  -     INFLUENZA, QUAD, HD, PF, 65+ (FLUZONE HD)        COUNSELING:  Reviewed preventive health counseling, as reflected in patient instructions    Estimated body mass index is 22.68 kg/m  as calculated from the following:    Height as of 12/9/21: 1.575 m (5' 2\").    Weight as of 12/9/21: 56.2 kg (124 lb).        She reports that she has quit smoking. She started smoking about 44 years ago. She smoked 0.10 packs per day. She has never used smokeless tobacco.      Appropriate preventive services were discussed with this patient, including applicable screening as appropriate for cardiovascular disease, diabetes, osteopenia/osteoporosis, and glaucoma.  As appropriate for age/gender, discussed screening for colorectal cancer, prostate cancer, breast cancer, and cervical cancer. Checklist reviewing preventive services available has been given to the patient.    Reviewed patients plan of care and provided an AVS. The Basic Care Plan (routine screening as documented in Health Maintenance) for Chetna meets the Care Plan requirement. This Care Plan has been established and reviewed with the Patient.        Tasha Moore MD  [facility] " "Risks:none  SUBJECTIVE:   Chetna is a 67 year old who presents for Preventive Visit.      Patient has been advised of split billing requirements and indicates understanding: Yes  Are you in the first 12 months of your Medicare coverage?  No       Healthy Habits:     In general, how would you rate your overall health?  Excellent    Frequency of exercise:  4-5 days/week    Duration of exercise:  30-45 minutes    Do you usually eat at least 4 servings of fruit and vegetables a day, include whole grains    & fiber and avoid regularly eating high fat or \"junk\" foods?  Yes    Taking medications regularly:  Yes    Medication side effects:  None    Ability to successfully perform activities of daily living:  No assistance needed    Home Safety:  No safety concerns identified    Hearing Impairment:  No hearing concerns    In the past 6 months, have you been bothered by leaking of urine?  No    In general, how would you rate your overall mental or emotional health?  Excellent      PHQ-2 Total Score: 0    Additional concerns today:  No      Chetna is going to [place] in 2 weeks .  Significantly, this falls on the same 2 weeks that coincides with the time last year when she was with her mother who being transitioned from nursing home care to hospice care.  Donna says she is glad she went and it was not her brothers who were managing this and of her mother's life.      Hypertension: Controlled on lisinopril, the only prescription medication she takes.    BP Readings from Last 6 Encounters:   09/16/22 124/62   12/09/21 126/72   07/20/21 130/70   10/15/20 122/74   09/25/20 116/78   10/04/19 128/70         Social History: Chetna retired at age  64 .  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   She is .  She enjoys traveling    Do you feel safe in your " environment? Yes    Have you ever done Advance Care Planning? (For example, a Health Directive, POLST, or a discussion with a medical provider or your loved ones about your wishes): No, advance care planning information given to patient to review.  Advanced care planning was discussed at today's visit.       Fall risk  Fallen 2 or more times in the past year?: No  Any fall with injury in the past year?: No    Cognitive Screening   1) Repeat 3 items (Leader, Season, Table)    2) Clock draw: NORMAL  3) 3 item recall: Recalls 3 objects  Results: 3 items recalled: COGNITIVE IMPAIRMENT LESS LIKELY    Mini-CogTM Copyright S Jamir. Licensed by the author for use in [facility]; reprinted with permission (soraudel@[facility]). All rights reserved.      Do you have sleep apnea, excessive snoring or daytime drowsiness?: no    Reviewed and updated as needed this visit by clinical staff   Tobacco  Allergies  Meds                Reviewed and updated as needed this visit by Provider                   Social History     Tobacco Use     Smoking status: Former Smoker     Packs/day: 0.10     Start date: 1/1/1978     Smokeless tobacco: Never Used   Substance Use Topics     Alcohol use: Yes     Alcohol/week: 4.0 standard drinks         Alcohol Use 9/9/2022   Prescreen: >3 drinks/day or >7 drinks/week? No               Current providers sharing in care for this patient include:   Patient Care Team:  Tasha Moore MD as PCP - General  Tasha Moore MD as Assigned PCP  Morena Johnson MD as Assigned OBGYN Provider    The following health maintenance items are reviewed in Epic and correct as of today:  Health Maintenance   Topic Date Due     DTAP/TDAP/TD IMMUNIZATION (2 - Td or Tdap) 06/27/2022     MEDICARE ANNUAL WELLNESS VISIT  07/20/2022     ANNUAL REVIEW OF HM ORDERS  07/20/2022     INFLUENZA VACCINE (1) 09/01/2022     COLORECTAL CANCER SCREENING  07/31/2022     PAP FOLLOW-UP  12/09/2022     HPV FOLLOW-UP  12/09/2022      "MAMMO SCREENING  12/09/2022     DEXA  07/27/2023     FALL RISK ASSESSMENT  09/16/2023     LIPID  04/03/2024     ADVANCE CARE PLANNING  07/26/2026     HEPATITIS C SCREENING  Completed     PHQ-2 (once per calendar year)  Completed     Pneumococcal Vaccine: 65+ Years  Completed     ZOSTER IMMUNIZATION  Completed     COVID-19 Vaccine  Completed     IPV IMMUNIZATION  Aged Out     MENINGITIS IMMUNIZATION  Aged Out     HEPATITIS B IMMUNIZATION  Aged Out     LUNG CANCER SCREENING  Discontinued           Breast CA Risk Assessment (FHS-7) 9/9/2022   Do you have a family history of breast, colon, or ovarian cancer? No / Unknown           Pertinent mammograms are reviewed under the imaging tab.    Review of Systems   Constitutional: Negative for chills and fever.   HENT: Negative for congestion, ear pain, hearing loss and sore throat.    Eyes: Negative for pain and visual disturbance.   Respiratory: Negative for cough and shortness of breath.    Cardiovascular: Negative for chest pain, palpitations and peripheral edema.   Gastrointestinal: Negative for abdominal pain, constipation, diarrhea, heartburn, hematochezia and nausea.   Breasts:  Negative for tenderness, breast mass and discharge.   Genitourinary: Negative for dysuria, frequency, genital sores, hematuria, pelvic pain, urgency, vaginal bleeding and vaginal discharge.   Musculoskeletal: Negative for arthralgias, joint swelling and myalgias.   Skin: Negative for rash.   Neurological: Negative for dizziness, weakness, headaches and paresthesias.   Psychiatric/Behavioral: Negative for mood changes. The patient is not nervous/anxious.          OBJECTIVE:   There were no vitals taken for this visit. Estimated body mass index is 22.68 kg/m  as calculated from the following:    Height as of 12/9/21: 1.575 m (5' 2\").    Weight as of 12/9/21: 56.2 kg (124 lb).  Physical Exam  General appearance - alert, well appearing, and in no distress  Mental status - normal mood, behavior, " speech, dress, motor activity, and thought processes  Eyes - pupils equal and reactive, extraocular eye movements intact, funduscopic exam normal, discs flat and sharp  Ears - bilateral TM's and external ear canals normal  Mouth - mucous membranes moist, pharynx normal without lesions  Neck - supple, no significant adenopathy, carotids upstroke normal bilaterally, no bruits, thyroid exam: thyroid is normal in size without nodules or tenderness  Chest - clear to auscultation, no wheezes, rales or rhonchi, symmetric air entry  Heart - normal rate, regular rhythm, normal S1, S2, no murmurs, rubs, clicks or gallops  Abdomen - soft, nontender, nondistended, no masses or organomegaly  Breasts - breasts appear normal, no suspicious masses, no skin or nipple changes or axillary nodes  Pelvic exam: normal vagina and vulva, normal cervix without lesions or tenderness, pap smear done.  Neurological - alert, oriented, normal speech, no focal findings or movement disorder noted, DTR's normal and symmetric  Extremities - peripheral pulses normal, no pedal edema, no clubbing or cyanosis  Skin - no rashes or worrisome lesions

## 2022-09-23 ENCOUNTER — ANCILLARY PROCEDURE (OUTPATIENT)
Dept: MAMMOGRAPHY | Facility: CLINIC | Age: 67
End: 2022-09-23
Attending: FAMILY MEDICINE
Payer: COMMERCIAL

## 2022-09-23 DIAGNOSIS — Z12.31 VISIT FOR SCREENING MAMMOGRAM: ICD-10-CM

## 2022-09-23 PROCEDURE — 77067 SCR MAMMO BI INCL CAD: CPT

## 2022-09-27 ENCOUNTER — PATIENT OUTREACH (OUTPATIENT)
Dept: FAMILY MEDICINE | Facility: CLINIC | Age: 67
End: 2022-09-27

## 2022-11-03 ENCOUNTER — TELEPHONE (OUTPATIENT)
Dept: OBGYN | Facility: CLINIC | Age: 67
End: 2022-11-03

## 2022-11-03 NOTE — TELEPHONE ENCOUNTER
Erwin called and asked for us to call her to verify if this test she is having on 11-8 is medically necessary. She is scheduled for a Colposcopy. Elena could you call and see if she needs procedure code or prior auth or what exactly.

## 2022-11-03 NOTE — TELEPHONE ENCOUNTER
Per Dr. Johnson with positive HPV the last two in a row, even with normal pap, it is recommended to have a colposcopy.

## 2022-11-04 NOTE — TELEPHONE ENCOUNTER
Pt called back and states we need to call her Provider Assistance Center and let them know if this is medically necessary. The # for this is [phone] and then please call pot to let her know when this is done. Pt # is [phone]

## 2022-11-07 NOTE — TELEPHONE ENCOUNTER
Pt called and states the latest is that [facility] wants someone to call their Provder Assistance number to verify that a [place] is medically necessary for this patient. Pls call [phone]. Pt's appt is with Dr Johnson tomorrow morning 11/8. Pt would like a call back once it has been done.

## 2022-11-07 NOTE — TELEPHONE ENCOUNTER
Return call to patient.  Advised that colposcopy should be a covered service and that [facility] Provider Service department has been informed that it is a medically necessary procedure.  Patient verbalizes understanding and offers no further questions or concerns at this time.

## 2022-11-07 NOTE — TELEPHONE ENCOUNTER
Telephone call to [facility] Provider Services line.  Spoke with May.  Advised that patient is having a colposcopy and that it is a medically necessary procedure.  May states that this should be a covered service according to the Medicare coverage guidelines.

## 2022-11-08 ENCOUNTER — OFFICE VISIT (OUTPATIENT)
Dept: OBGYN | Facility: CLINIC | Age: 67
End: 2022-11-08
Payer: COMMERCIAL

## 2022-11-08 VITALS
HEIGHT: 61 IN | WEIGHT: 124 LBS | HEART RATE: 74 BPM | BODY MASS INDEX: 23.41 KG/M2 | DIASTOLIC BLOOD PRESSURE: 78 MMHG | OXYGEN SATURATION: 97 % | SYSTOLIC BLOOD PRESSURE: 126 MMHG

## 2022-11-08 DIAGNOSIS — Z87.410 HISTORY OF CERVICAL DYSPLASIA: Primary | ICD-10-CM

## 2022-11-08 DIAGNOSIS — R87.810 CERVICAL HIGH RISK HUMAN PAPILLOMAVIRUS (HPV) DNA TEST POSITIVE: ICD-10-CM

## 2022-11-08 PROCEDURE — 57452 EXAM OF CERVIX W/SCOPE: CPT | Performed by: OBSTETRICS & GYNECOLOGY

## 2022-11-08 NOTE — PROGRESS NOTES
"Colposcopy Procedure Note    Indications: Pap smear on 9/16/22 showed NILM with +other HRHPV.  Pertinent past history includes the same results 12/9/21.  She has a history of a cone or LEEP around 1983.  She also has a more recent history of LSIL and HPV effect on biopsy.    Procedure Details   /78 (BP Location: Right arm, Patient Position: Sitting, Cuff Size: Adult Regular)   Pulse 74   Ht 1.556 m (5' 1.25\")   Wt 56.2 kg (124 lb)   Body mass index is 23.24 kg/m .    The reason for procedure is explained, and informed consent is obtained.      Speculum is placed in the vagina and visualization of cervix is achieved.  The cervix and vagina are atrophic.  The cervix is swabbed with 3% acetic acid solution. Transformation zone is easily seen.  Green filter is applied with no abnormal vessels seen.  Acetowhite epithelium is not seen.  ECC is not done.  Adequate colposcopy.  The patient tolerated the procedure well.    Impression: normal atrophic cervix    Plan: Post procedure instructions are reviewed.  The role of HPV in causing cervical pap smear abnormalities, dysplasia, and cancer is reviewed with the patient.  We discussed the role of the immune system and ways to keep it strong.  She was counseled to return to Dr Moore for a Pap with HPV testing as recommended in a year.  If the Pap results are the same, she doesn't need a colposcopy next year.  If the Pap is abnormal, she does.  "

## 2022-11-09 ENCOUNTER — PATIENT OUTREACH (OUTPATIENT)
Dept: FAMILY MEDICINE | Facility: CLINIC | Age: 67
End: 2022-11-09

## 2022-11-09 NOTE — TELEPHONE ENCOUNTER
11/8/22 Ault no bx's taken, visually normal Plan cotest in 1 year due 09/16/23 also per provider office visit note  If the Pap results are the same, she doesn't need a colposcopy next year.  If the Pap is abnormal, she does.

## 2022-11-17 ENCOUNTER — TRANSFERRED RECORDS (OUTPATIENT)
Dept: HEALTH INFORMATION MANAGEMENT | Facility: CLINIC | Age: 67
End: 2022-11-17

## 2022-12-22 DIAGNOSIS — I10 ESSENTIAL HYPERTENSION: ICD-10-CM

## 2022-12-23 RX ORDER — LISINOPRIL 20 MG/1
TABLET ORAL
Qty: 90 TABLET | Refills: 2 | Status: SHIPPED | OUTPATIENT
Start: 2022-12-23 | End: 2024-09-30

## 2022-12-23 NOTE — TELEPHONE ENCOUNTER
"Last Written Prescription Date:  3/17/22  Last Fill Quantity: 90,  # refills: 2   Last office visit provider:  9/16/22     Requested Prescriptions   Pending Prescriptions Disp Refills     lisinopril (ZESTRIL) 20 MG tablet [Pharmacy Med Name: LISINOPRIL 20 MG TABLET] 90 tablet 2     Sig: TAKE 1 TABLET BY MOUTH EVERY DAY       ACE Inhibitors (Including Combos) Protocol Passed - 12/23/2022  7:56 AM        Passed - Blood pressure under 140/90 in past 12 months     BP Readings from Last 3 Encounters:   11/08/22 126/78   09/16/22 124/62   12/09/21 126/72                 Passed - Recent (12 mo) or future (30 days) visit within the authorizing provider's specialty     Patient has had an office visit with the authorizing provider or a provider within the authorizing providers department within the previous 12 mos or has a future within next 30 days. See \"Patient Info\" tab in inbasket, or \"Choose Columns\" in Meds & Orders section of the refill encounter.              Passed - Medication is active on med list        Passed - Patient is age 18 or older        Passed - No active pregnancy on record        Passed - Normal serum creatinine on file in past 12 months     Recent Labs   Lab Test 09/16/22  1429   CR 0.93       Ok to refill medication if creatinine is low          Passed - Normal serum potassium on file in past 12 months     Recent Labs   Lab Test 09/16/22  1429   POTASSIUM 4.1             Passed - No positive pregnancy test within past 12 months             Darvin Parnell RN 12/23/22 7:56 AM  "

## 2023-05-12 ENCOUNTER — OFFICE VISIT (OUTPATIENT)
Dept: FAMILY MEDICINE | Facility: CLINIC | Age: 68
End: 2023-05-12
Payer: COMMERCIAL

## 2023-05-12 VITALS
OXYGEN SATURATION: 97 % | SYSTOLIC BLOOD PRESSURE: 132 MMHG | DIASTOLIC BLOOD PRESSURE: 70 MMHG | RESPIRATION RATE: 14 BRPM | HEIGHT: 61 IN | HEART RATE: 81 BPM | WEIGHT: 123 LBS | BODY MASS INDEX: 23.22 KG/M2

## 2023-05-12 DIAGNOSIS — L30.9 DERMATITIS: Primary | ICD-10-CM

## 2023-05-12 PROCEDURE — 99213 OFFICE O/P EST LOW 20 MIN: CPT | Performed by: FAMILY MEDICINE

## 2023-05-12 RX ORDER — TRIAMCINOLONE ACETONIDE 1 MG/G
CREAM TOPICAL 2 TIMES DAILY
Qty: 30 G | Refills: 1 | Status: SHIPPED | OUTPATIENT
Start: 2023-05-12 | End: 2023-05-26

## 2023-05-12 NOTE — PROGRESS NOTES
"  Assessment & Plan     Dermatitis  No clear trigger and no previous history.  Treat with  - triamcinolone (KENALOG) 0.1 % external cream  Dispense: 30 g; Refill: 1  (She had also had a dermatology scheduled and may still go to this, but needed to address symptoms earlier so scheduled this visit today)    Return in about 4 months (around 9/12/2023) for medicare annual wellness, or earlier as needed, if symptoms fail to improve or worsen.    Tasha Moore MD  [facility]    Mustapha Basilio is a 67 year old, presenting for the following health issues:  Derm Problem (Arm, and back of neck/Dose have a derm appointments in June)        5/12/2023     3:02 PM   Additional Questions   Roomed by Hayde HAAS     History of Present Illness       Reason for visit:  Skin rash neck and L arm  Symptom onset:  1-2 weeks ago  Symptoms include:  Itching, raised skin rash  Symptom intensity:  Moderate  Symptom progression:  Worsening  Had these symptoms before:  No    She eats 2-3 servings of fruits and vegetables daily.She consumes 0 sweetened beverage(s) daily.She exercises with enough effort to increase her heart rate 30 to 60 minutes per day.  She exercises with enough effort to increase her heart rate 4 days per week.   She is taking medications regularly.     \"Both rashes came at the same time but they look different\"   - Both are very itchy   - No change in body products, Has not worn new clothes without washing   - no recent illness   - no previous issus like this   - has dermatology visit scheduled, but needs to address before then because of the itching          Objective    /70   Pulse 81   Resp 14   Ht 1.556 m (5' 1.25\")   Wt 55.8 kg (123 lb)   LMP  (LMP Unknown)   SpO2 97%   BMI 23.05 kg/m    Body mass index is 23.05 kg/m .  Physical Exam   General appearance - alert, well appearing, and in no distress  Mental status - normal mood, behavior, speech, dress, motor " activity, and thought processes  Skin -    - 3 cm round patch of grouped pink  papules on left lateral elbow   - similar patch base of neck and into hairline    - 2 cm patch irregular lateral lower margin left breast    These all look similar to me

## 2023-07-28 ENCOUNTER — TRANSFERRED RECORDS (OUTPATIENT)
Dept: HEALTH INFORMATION MANAGEMENT | Facility: CLINIC | Age: 68
End: 2023-07-28
Payer: COMMERCIAL

## 2023-09-01 ENCOUNTER — TRANSFERRED RECORDS (OUTPATIENT)
Dept: HEALTH INFORMATION MANAGEMENT | Facility: CLINIC | Age: 68
End: 2023-09-01
Payer: COMMERCIAL

## 2023-09-18 ASSESSMENT — ENCOUNTER SYMPTOMS
DIARRHEA: 0
EYE PAIN: 0
FEVER: 0
COUGH: 0
NAUSEA: 0
DIZZINESS: 0
MYALGIAS: 0
HEADACHES: 0
HEARTBURN: 0
ARTHRALGIAS: 0
ABDOMINAL PAIN: 0
FREQUENCY: 0
HEMATURIA: 0
JOINT SWELLING: 0
SORE THROAT: 0
BREAST MASS: 0
PARESTHESIAS: 1
CONSTIPATION: 0
WEAKNESS: 0
HEMATOCHEZIA: 0
PALPITATIONS: 0
CHILLS: 0
DYSURIA: 0
SHORTNESS OF BREATH: 0
NERVOUS/ANXIOUS: 0

## 2023-09-18 ASSESSMENT — ACTIVITIES OF DAILY LIVING (ADL): CURRENT_FUNCTION: NO ASSISTANCE NEEDED

## 2023-09-19 ENCOUNTER — OFFICE VISIT (OUTPATIENT)
Dept: FAMILY MEDICINE | Facility: CLINIC | Age: 68
End: 2023-09-19
Payer: COMMERCIAL

## 2023-09-19 VITALS
HEART RATE: 81 BPM | TEMPERATURE: 97.8 F | DIASTOLIC BLOOD PRESSURE: 71 MMHG | RESPIRATION RATE: 16 BRPM | OXYGEN SATURATION: 96 % | BODY MASS INDEX: 23.18 KG/M2 | WEIGHT: 122.8 LBS | HEIGHT: 61 IN | SYSTOLIC BLOOD PRESSURE: 128 MMHG

## 2023-09-19 DIAGNOSIS — Z12.31 SCREENING MAMMOGRAM, ENCOUNTER FOR: ICD-10-CM

## 2023-09-19 DIAGNOSIS — I10 ESSENTIAL HYPERTENSION: ICD-10-CM

## 2023-09-19 DIAGNOSIS — Z78.0 MENOPAUSE: ICD-10-CM

## 2023-09-19 DIAGNOSIS — R87.810 PAPANICOLAOU SMEAR OF CERVIX WITH POSITIVE HIGH RISK HUMAN PAPILLOMA VIRUS (HPV) TEST: ICD-10-CM

## 2023-09-19 DIAGNOSIS — M85.89 OSTEOPENIA OF MULTIPLE SITES: ICD-10-CM

## 2023-09-19 DIAGNOSIS — Z00.00 ENCOUNTER FOR MEDICARE ANNUAL WELLNESS EXAM: Primary | ICD-10-CM

## 2023-09-19 DIAGNOSIS — E55.9 VITAMIN D DEFICIENCY: ICD-10-CM

## 2023-09-19 PROCEDURE — 82306 VITAMIN D 25 HYDROXY: CPT | Performed by: FAMILY MEDICINE

## 2023-09-19 PROCEDURE — 36415 COLL VENOUS BLD VENIPUNCTURE: CPT | Performed by: FAMILY MEDICINE

## 2023-09-19 PROCEDURE — G0439 PPPS, SUBSEQ VISIT: HCPCS | Performed by: FAMILY MEDICINE

## 2023-09-19 PROCEDURE — 90662 IIV NO PRSV INCREASED AG IM: CPT | Performed by: FAMILY MEDICINE

## 2023-09-19 PROCEDURE — G0145 SCR C/V CYTO,THINLAYER,RESCR: HCPCS | Performed by: FAMILY MEDICINE

## 2023-09-19 PROCEDURE — 87624 HPV HI-RISK TYP POOLED RSLT: CPT | Performed by: FAMILY MEDICINE

## 2023-09-19 PROCEDURE — 99213 OFFICE O/P EST LOW 20 MIN: CPT | Mod: 25 | Performed by: FAMILY MEDICINE

## 2023-09-19 PROCEDURE — G0008 ADMIN INFLUENZA VIRUS VAC: HCPCS | Performed by: FAMILY MEDICINE

## 2023-09-19 RX ORDER — AMLODIPINE BESYLATE 10 MG/1
10 TABLET ORAL DAILY
Qty: 30 TABLET | Refills: 1 | Status: SHIPPED | OUTPATIENT
Start: 2023-09-19 | End: 2023-11-17

## 2023-09-19 ASSESSMENT — ENCOUNTER SYMPTOMS
NAUSEA: 0
PARESTHESIAS: 1
DIZZINESS: 0
FEVER: 0
HEARTBURN: 0
SORE THROAT: 0
WEAKNESS: 0
DYSURIA: 0
PALPITATIONS: 0
CHILLS: 0
COUGH: 0
ARTHRALGIAS: 0
ABDOMINAL PAIN: 0
HEMATOCHEZIA: 0
EYE PAIN: 0
FREQUENCY: 0
HEADACHES: 0
HEMATURIA: 0
CONSTIPATION: 0
SHORTNESS OF BREATH: 0
JOINT SWELLING: 0
MYALGIAS: 0
NERVOUS/ANXIOUS: 0
BREAST MASS: 0
DIARRHEA: 0

## 2023-09-19 ASSESSMENT — ACTIVITIES OF DAILY LIVING (ADL): CURRENT_FUNCTION: NO ASSISTANCE NEEDED

## 2023-09-19 NOTE — PATIENT INSTRUCTIONS
"Go back to taking one zyrtec daily for a few days, then stop lisinopril and start amlodipine: start by taking half a pill daily, if your blood pressure is consistently >= 130/80 go to two pills daily  Patient Education   Personalized Prevention Plan  You are due for the preventive services outlined below.  Your care team is available to assist you in scheduling these services.  If you have already completed any of these items, please share that information with your care team to update in your medical record.  Health Maintenance Due   Topic Date Due     PAP  11/09/2022     COVID-19 Vaccine (6 - Moderna series) 01/16/2023     Osteoporosis Screening  07/27/2023     HPV Follow Up  09/16/2023     Learning About Being Physically Active  What is physical activity?     Being physically active means doing any kind of activity that gets your body moving.  The types of physical activity that can help you get fit and stay healthy include:  Aerobic or \"cardio\" activities. These make your heart beat faster and make you breathe harder, such as brisk walking, riding a bike, or running. They strengthen your heart and lungs and build up your endurance.  Strength training activities. These make your muscles work against, or \"resist,\" something. Examples include lifting weights or doing push-ups. These activities help tone and strengthen your muscles and bones.  Stretches. These let you move your joints and muscles through their full range of motion. Stretching helps you be more flexible.  Reaching a balance between these three types of physical activity is important because each one contributes to your overall fitness.  What are the benefits of being active?  Being active is one of the best things you can do for your health. It helps you to:  Feel stronger and have more energy to do all the things you like to do.  Focus better at school or work.  Feel, think, and sleep better.  Reach and stay at a healthy weight.  Lose fat and build " "lean muscle.  Lower your risk for serious health problems, including diabetes, heart attack, high blood pressure, and some cancers.  Keep your heart, lungs, bones, muscles, and joints strong and healthy.  How can you make being active part of your life?  Start slowly. Make it your long-term goal to get at least 30 minutes of exercise on most days of the week. Walking is a good choice. You also may want to do other activities, such as running, swimming, cycling, or playing tennis or team sports.  Pick activities that you like--ones that make your heart beat faster, your muscles stronger, and your muscles and joints more flexible. If you find more than one thing you like doing, do them all. You don't have to do the same thing every day.  Get your heart pumping every day. Any activity that makes your heart beat faster and keeps it at that rate for a while counts.  Here are some great ways to get your heart beating faster:  Go for a brisk walk, run, or bike ride.  Go for a hike or swim.  Go in-line skating.  Play a game of touch football, basketball, or soccer.  Ride a bike.  Play tennis or racquetball.  Climb stairs.  Even some household chores can be aerobic--just do them at a faster pace. Vacuuming, raking or mowing the lawn, sweeping the garage, and washing and waxing the car all can help get your heart rate up.  Strengthen your muscles during the week. You don't have to lift heavy weights or grow big, bulky muscles to get stronger. Doing a few simple activities that make your muscles work against, or \"resist,\" something can help you get stronger.  For example, you can:  Do push-ups or sit-ups, which use your own body weight as resistance.  Lift weights or dumbbells or use stretch bands at home or in a gym or community center.  Stretch your muscles often. Stretching will help you as you become more active. It can help you stay flexible, loosen tight muscles, and avoid injury. It can also help improve your balance " "and posture and can be a great way to relax.  Be sure to stretch the muscles you'll be using when you work out. It's best to warm your muscles slightly before you stretch them. Walk or do some other light aerobic activity for a few minutes, and then start stretching.  When you stretch your muscles:  Do it slowly. Stretching is not about going fast or making sudden movements.  Don't push or bounce during a stretch.  Hold each stretch for at least 15 to 30 seconds, if you can. You should feel a stretch in the muscle, but not pain.  Breathe out as you do the stretch. Then breathe in as you hold the stretch. Don't hold your breath.  If you're worried about how more activity might affect your health, have a checkup before you start. Follow any special advice your doctor gives you for getting a smart start.  Where can you learn more?  Go to https://www.[email or web address].net/patiented  Enter W332 in the search box to learn more about \"Learning About Being Physically Active.\"  Current as of: October 10, 2022               Content Version: 13.7    2970-8188 [employer].   Care instructions adapted under license by your healthcare professional. If you have questions about a medical condition or this instruction, always ask your healthcare professional. [employer] disclaims any warranty or liability for your use of this information.         "

## 2023-09-19 NOTE — PROGRESS NOTES
ASSESSMENT / PLAN:   Chetna was seen today for wellness visit and recheck medication.    Diagnoses and all orders for this visit:    Encounter for Medicare annual wellness exam    Essential hypertension  Donna was found to have medication induced dermatitis.  She had stopped all of her vitamins and supplements was only taking lisinopril and Zyrtec.  Given that she still having some symptoms question is whether lisinopril could be a cause.  Therefore she will try stopping this and starting  -     amLODIPine (NORVASC) 10 MG tablet; Take 1 tablet (10 mg) by mouth daily    Patient Instructions  Go back to taking one zyrtec daily for a few days, then stop lisinopril and start amlodipine: start by taking half a pill daily, if your blood pressure is consistently >= 130/80 go to two pills daily    Menopause / Osteopenia  -     DEXA HIP/PELVIS/SPINE - Future; Future    Papanicolaou smear of cervix with positive high risk human papilloma virus (HPV) test  -     Pap Screen with HPV - recommended age 30 - 65 years  -     HPV Hold (Lab Only)    Vitamin D deficiency  -     Vitamin D Deficiency    Screening mammogram, encounter for  -     *MA Screening Digital Bilateral; Future    Other orders  -     REVIEW OF HEALTH MAINTENANCE PROTOCOL ORDERS  -     INFLUENZA VACCINE 65+ (FLUZONE HD)  -     PRIMARY CARE FOLLOW-UP SCHEDULING; Future    Return in about 53 weeks (around 9/24/2024) for medicare annual wellness, or earlier as needed.      COUNSELING:  Reviewed preventive health counseling, as reflected in patient instructions        She reports that she has quit smoking. She started smoking about 45 years ago. She smoked an average of .1 packs per day. She has never used smokeless tobacco.      Appropriate preventive services were discussed with this patient, including applicable screening as appropriate for cardiovascular disease, diabetes, osteopenia/osteoporosis, and glaucoma.  As appropriate for age/gender, discussed screening  "for colorectal cancer, prostate cancer, breast cancer, and cervical cancer. Checklist reviewing preventive services available has been given to the patient.    Reviewed patients plan of care and provided an AVS. The Basic Care Plan (routine screening as documented in Health Maintenance) for Chetna meets the Care Plan requirement. This Care Plan has been established and reviewed with the Patient.      Tasha Moore MD  [facility]    Identified Health Risks:  I have reviewed Opioid Use Disorder and Substance Use Disorder risk factors and made any needed referrals.   She is at risk for lack of exercise and has been provided with information to increase physical activity for the benefit of her well-being.    SUBJECTIVE:   Chetna is a 68 year old who presents for Preventive Visit.      9/19/2023     1:27 PM   Additional Questions   Roomed by nicole   Accompanied by sharee         9/19/2023     1:27 PM   Patient Reported Additional Medications   Patient reports taking the following new medications none       Are you in the first 12 months of your Medicare coverage?  No    Healthy Habits:     In general, how would you rate your overall health?  Good    Frequency of exercise:  None    Do you usually eat at least 4 servings of fruit and vegetables a day, include whole grains    & fiber and avoid regularly eating high fat or \"junk\" foods?  Yes    Taking medications regularly:  Yes    Medication side effects:  Other    Ability to successfully perform activities of daily living:  No assistance needed    Home Safety:  No safety concerns identified    Hearing Impairment:  No hearing concerns    In the past 6 months, have you been bothered by leaking of urine?  No    In general, how would you rate your overall mental or emotional health?  Good    Additional concerns today:  No    Dermatitis: I saw Chetna on 5/12/2023 for this and prescribed triamcinolone topical.  I was unclear about the cause of " her rash as she could list at the time no new exposures.  She saw dermatology and they did a biopsy which supported the diagnosis of a drug-induced rash.  She stopped dairy, gluten, fermented foods and all medications except for Zyrtec and lisinopril.  She is taking Zyrtec twice a day and that helps.  Using Cetaphil and a refill of the cream I have given her is also helping.  She has an appointment with [facility] in [place] to do patch testing.    She lisinopril for blood pressure.  I think she could start amlodipine as an alternate and  try stopping lisinopril and see if this makes a difference, while still monitoring her blood pressure carefully    BP Readings from Last 6 Encounters:   09/19/23 128/71   05/12/23 132/70   11/08/22 126/78   09/16/22 124/62   12/09/21 126/72   07/20/21 130/70       Worried about vitamin D - a friend had vitamin D too high.  Donna says she has been taking 125 mcg daily for years-this is equivalent to 5000 international units daily which I agree is a bit high.  We will see what her  shows    History of Pap smear with a high risk HPV.  She previously got a bill for her Pap smear, further investigation reveals that I had signed the pended order which was for screening Pap smear rather than a diagnostic Pap smear.  We will make sure her Pap smear today is under the diagnostic category      Social History: Chetna retired at age  64 .  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   She is .  She enjoys traveling     Have you ever done Advance Care Planning? (For example, a Health Directive, POLST, or a discussion with a medical provider or your loved ones about your wishes): No, advance care planning information given to patient to review.  Patient declined advance care planning discussion at this  time.       Fall risk  Fallen 2 or more times in the past year?: No  Any fall with injury in the past year?: No    Cognitive Screening   1) Repeat 3 items (Leader, Season, Table)    2) Clock draw: NORMAL  3) 3 item recall: Recalls 3 objects  Results: 3 items recalled: COGNITIVE IMPAIRMENT LESS LIKELY    Mini-CogTM Copyright CIARRA Bowie. Licensed by the author for use in [facility]; reprinted with permission (vimal@[facility]). All rights reserved.      Do you have sleep apnea, excessive snoring or daytime drowsiness? : no    Reviewed and updated as needed this visit by clinical staff   Tobacco  Allergies  Meds              Reviewed and updated as needed this visit by Provider                 Social History     Tobacco Use     Smoking status: Former     Packs/day: 0.10     Types: Cigarettes     Start date: 1/1/1978     Smokeless tobacco: Never   Substance Use Topics     Alcohol use: Yes     Alcohol/week: 4.0 standard drinks of alcohol             9/18/2023     2:24 PM   Alcohol Use   Prescreen: >3 drinks/day or >7 drinks/week? No     Do you have a current opioid prescription? No  Do you use any other controlled substances or medications that are not prescribed by a provider? None    Current providers sharing in care for this patient include:   Patient Care Team:  Tasha Moore MD as PCP - General  Tasha Moore MD as Assigned PCP  Morena Johnson MD as Assigned OBGYN Provider    The following health maintenance items are reviewed in Epic and correct as of today:  Health Maintenance   Topic Date Due     ANNUAL REVIEW OF HM ORDERS  07/20/2022     PAP FOLLOW-UP  11/09/2022     COVID-19 Vaccine (6 - Moderna series) 01/16/2023     DEXA  07/27/2023     INFLUENZA VACCINE (1) 09/01/2023     MEDICARE ANNUAL WELLNESS VISIT  09/16/2023     HPV FOLLOW-UP  09/16/2023     LIPID  04/03/2024     FALL RISK ASSESSMENT  09/19/2024     MAMMO SCREENING  09/23/2024     ADVANCE CARE PLANNING  09/18/2027     DTAP/TDAP/TD  "IMMUNIZATION (3 - Td or Tdap) 10/04/2032     HEPATITIS C SCREENING  Completed     PHQ-2 (once per calendar year)  Completed     Pneumococcal Vaccine: 65+ Years  Completed     ZOSTER IMMUNIZATION  Completed     IPV IMMUNIZATION  Aged Out     HPV IMMUNIZATION  Aged Out     MENINGITIS IMMUNIZATION  Aged Out     COLORECTAL CANCER SCREENING  Discontinued     LUNG CANCER SCREENING  Discontinued               9/9/2022    11:01 AM   Breast CA Risk Assessment (FHS-7)   Do you have a family history of breast, colon, or ovarian cancer? No / Unknown           Pertinent mammograms are reviewed under the imaging tab.    Review of Systems   Constitutional:  Negative for chills and fever.   HENT:  Negative for congestion, ear pain, hearing loss and sore throat.    Eyes:  Negative for pain and visual disturbance.   Respiratory:  Negative for cough and shortness of breath.    Cardiovascular:  Negative for chest pain, palpitations and peripheral edema.   Gastrointestinal:  Negative for abdominal pain, constipation, diarrhea, heartburn, hematochezia and nausea.   Breasts:  Negative for tenderness, breast mass and discharge.   Genitourinary:  Negative for dysuria, frequency, genital sores, hematuria, pelvic pain, vaginal bleeding and vaginal discharge.   Musculoskeletal:  Negative for arthralgias, joint swelling and myalgias.   Skin:  Positive for rash.   Neurological:  Positive for paresthesias. Negative for dizziness, weakness and headaches.   Psychiatric/Behavioral:  Negative for mood changes. The patient is not nervous/anxious.          OBJECTIVE:   /71 (BP Location: Left arm, Patient Position: Sitting, Cuff Size: Adult Regular)   Pulse 81   Temp 97.8  F (36.6  C) (Tympanic)   Resp 16   Ht 1.549 m (5' 1\")   Wt 55.7 kg (122 lb 12.8 oz)   LMP  (LMP Unknown)   SpO2 96%   Breastfeeding No   BMI 23.20 kg/m   Estimated body mass index is 23.2 kg/m  as calculated from the following:    Height as of this encounter: 1.549 m " "(5' 1\").    Weight as of this encounter: 55.7 kg (122 lb 12.8 oz).  Physical Exam  General appearance - alert, well appearing, and in no distress  Mental status - normal mood, behavior, speech, dress, motor activity, and thought processes  Eyes - pupils equal and reactive, extraocular eye movements intact, funduscopic exam normal, discs flat and sharp  Ears - bilateral TM's and external ear canals normal  Mouth - mucous membranes moist, pharynx normal without lesions  Neck - supple, no significant adenopathy, carotids upstroke normal bilaterally, no bruits, thyroid exam: thyroid is normal in size without nodules or tenderness  Chest - clear to auscultation, no wheezes, rales or rhonchi, symmetric air entry  Heart - normal rate, regular rhythm, normal S1, S2, no murmurs, rubs, clicks or gallops  Abdomen - soft, nontender, nondistended, no masses or organomegaly  Breasts - breasts appear normal, no suspicious masses, no skin or nipple changes or axillary nodes  Pelvic exam: normal vagina and vulva, normal cervix without lesions or tenderness, pap smear done.  Neurological - alert, oriented, normal speech, no focal findings or movement disorder noted, DTR's normal and symmetric  Extremities - peripheral pulses normal, no pedal edema, no clubbing or cyanosis  Skin - no rashes or worrisome lesions    "

## 2023-09-22 LAB
BKR LAB AP GYN ADEQUACY: NORMAL
BKR LAB AP GYN INTERPRETATION: NORMAL
BKR LAB AP HPV REFLEX: NORMAL
BKR LAB AP LMP: NORMAL
BKR LAB AP PREVIOUS ABNL DX: NORMAL
BKR LAB AP PREVIOUS ABNORMAL: NORMAL
DEPRECATED CALCIDIOL+CALCIFEROL SERPL-MC: 62 UG/L (ref 20–75)
PATH REPORT.COMMENTS IMP SPEC: NORMAL
PATH REPORT.COMMENTS IMP SPEC: NORMAL
PATH REPORT.RELEVANT HX SPEC: NORMAL

## 2023-09-27 ENCOUNTER — ANCILLARY PROCEDURE (OUTPATIENT)
Dept: MAMMOGRAPHY | Facility: CLINIC | Age: 68
End: 2023-09-27
Attending: FAMILY MEDICINE
Payer: COMMERCIAL

## 2023-09-27 ENCOUNTER — PATIENT OUTREACH (OUTPATIENT)
Dept: FAMILY MEDICINE | Facility: CLINIC | Age: 68
End: 2023-09-27

## 2023-09-27 ENCOUNTER — ANCILLARY PROCEDURE (OUTPATIENT)
Dept: BONE DENSITY | Facility: CLINIC | Age: 68
End: 2023-09-27
Attending: FAMILY MEDICINE
Payer: COMMERCIAL

## 2023-09-27 DIAGNOSIS — Z78.0 MENOPAUSE: ICD-10-CM

## 2023-09-27 DIAGNOSIS — Z12.31 SCREENING MAMMOGRAM, ENCOUNTER FOR: ICD-10-CM

## 2023-09-27 PROCEDURE — 77067 SCR MAMMO BI INCL CAD: CPT | Mod: TC | Performed by: STUDENT IN AN ORGANIZED HEALTH CARE EDUCATION/TRAINING PROGRAM

## 2023-09-27 PROCEDURE — 77063 BREAST TOMOSYNTHESIS BI: CPT | Mod: TC | Performed by: STUDENT IN AN ORGANIZED HEALTH CARE EDUCATION/TRAINING PROGRAM

## 2023-09-27 PROCEDURE — 77080 DXA BONE DENSITY AXIAL: CPT | Mod: TC | Performed by: PHYSICIAN ASSISTANT

## 2023-09-28 ENCOUNTER — MYC MEDICAL ADVICE (OUTPATIENT)
Dept: FAMILY MEDICINE | Facility: CLINIC | Age: 68
End: 2023-09-28
Payer: COMMERCIAL

## 2023-10-02 ENCOUNTER — TRANSFERRED RECORDS (OUTPATIENT)
Dept: HEALTH INFORMATION MANAGEMENT | Facility: CLINIC | Age: 68
End: 2023-10-02
Payer: COMMERCIAL

## 2023-10-06 NOTE — TELEPHONE ENCOUNTER
(RN)Spoke with patient who states she did read the My Chart message and apologizes for not responding.     She states the side effects of the amlodipine have subsided and states she would prefer to continue on it for now instead of switching medications. States she does not want to be on a beta blocker.     She also comments that the rash stopped after her dermatologist recommended she take Zyrtec twice per day and that she was still on lisinopril when she started it.     She lastly states she will update Dr Moore with her BP readings over the past week. States she has them written down since she started taking daily BP readings on 9/29

## 2023-10-09 ENCOUNTER — TRANSFERRED RECORDS (OUTPATIENT)
Dept: HEALTH INFORMATION MANAGEMENT | Facility: CLINIC | Age: 68
End: 2023-10-09
Payer: COMMERCIAL

## 2023-10-12 DIAGNOSIS — I10 ESSENTIAL HYPERTENSION: ICD-10-CM

## 2023-10-12 RX ORDER — AMLODIPINE BESYLATE 10 MG/1
10 TABLET ORAL DAILY
Qty: 90 TABLET | Refills: 1 | OUTPATIENT
Start: 2023-10-12

## 2023-11-17 DIAGNOSIS — I10 ESSENTIAL HYPERTENSION: ICD-10-CM

## 2023-11-17 RX ORDER — AMLODIPINE BESYLATE 10 MG/1
10 TABLET ORAL DAILY
Qty: 90 TABLET | Refills: 3 | Status: SHIPPED | OUTPATIENT
Start: 2023-11-17 | End: 2024-04-09 | Stop reason: SINTOL

## 2023-11-17 NOTE — TELEPHONE ENCOUNTER
From last visit:    Essential hypertension  Donna was found to have medication induced dermatitis.  She had stopped all of her vitamins and supplements was only taking lisinopril and Zyrtec.  Given that she still having some symptoms question is whether lisinopril could be a cause.  Therefore she will try stopping this and starting  -     amLODIPine (NORVASC) 10 MG tablet; Take 1 tablet (10 mg) by mouth daily     Patient Instructions  Go back to taking one zyrtec daily for a few days, then stop lisinopril and start amlodipine: start by taking half a pill daily, if your blood pressure is consistently >= 130/80 go to two pills daily      ---  Spoke with patient who states she did read the My Chart message and apologizes for not responding.     She states the side effects of the amlodipine have subsided and states she would prefer to continue on it for now instead of switching medications. States she does not want to be on a beta blocker.     She also comments that the rash stopped after her dermatologist recommended she take Zyrtec twice per day and that she was still on lisinopril when she started it.     She lastly states she will update Dr Moore with her BP readings over the past week. States she has them written down since she started taking daily BP readings on 9/29.

## 2023-11-20 ENCOUNTER — NURSE TRIAGE (OUTPATIENT)
Dept: FAMILY MEDICINE | Facility: CLINIC | Age: 68
End: 2023-11-20
Payer: COMMERCIAL

## 2023-11-20 ENCOUNTER — OFFICE VISIT (OUTPATIENT)
Dept: URGENT CARE | Facility: URGENT CARE | Age: 68
End: 2023-11-20
Payer: COMMERCIAL

## 2023-11-20 ENCOUNTER — ANCILLARY PROCEDURE (OUTPATIENT)
Dept: GENERAL RADIOLOGY | Facility: CLINIC | Age: 68
End: 2023-11-20
Attending: PHYSICIAN ASSISTANT
Payer: COMMERCIAL

## 2023-11-20 VITALS
HEART RATE: 88 BPM | BODY MASS INDEX: 23.41 KG/M2 | RESPIRATION RATE: 16 BRPM | DIASTOLIC BLOOD PRESSURE: 80 MMHG | TEMPERATURE: 98.1 F | WEIGHT: 124 LBS | OXYGEN SATURATION: 97 % | SYSTOLIC BLOOD PRESSURE: 132 MMHG | HEIGHT: 61 IN

## 2023-11-20 DIAGNOSIS — S63.502A SPRAIN OF LEFT WRIST, INITIAL ENCOUNTER: ICD-10-CM
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
## 2023-11-20 DIAGNOSIS — M19.031 LOCALIZED PRIMARY OSTEOARTHRITIS OF CARPOMETACARPAL (CMC) JOINT OF RIGHT WRIST: ICD-10-CM

## 2023-11-20 DIAGNOSIS — M85.88 OSTEOPENIA OF OTHER SITE: Primary | ICD-10-CM

## 2023-11-20 PROCEDURE — 73110 X-RAY EXAM OF WRIST: CPT | Mod: TC | Performed by: RADIOLOGY

## 2023-11-20 PROCEDURE — 99213 OFFICE O/P EST LOW 20 MIN: CPT | Performed by: PHYSICIAN ASSISTANT

## 2023-11-20 ASSESSMENT — ENCOUNTER SYMPTOMS
COLOR CHANGE: 1
ARTHRALGIAS: 1

## 2023-11-20 NOTE — TELEPHONE ENCOUNTER
"THIS PLAN WAS ALREADY IMPLEMENTED.    S-(situation): pt fell last night.    B-(background): pt wants a right wrist xray done.    A-(assessment): some swelling. Can't use the wrist and hand.  She can wiggle the fingers. Iced it, elevated it, ibuprofen.  Twisting action hurts.    R-(recommendations): Pt wants an xray.  Reviewed going to  at  at 1000. Pt will also call her insurance card. What ortho walk in could be covered. That could be another option.    Woody Reid RN-[facility]     Reason for Disposition   SEVERE pain (e.g., excruciating)    Additional Information   Negative: Major bleeding (actively dripping or spurting) that can't be stopped   Negative: Amputation or bone sticking through the skin   Negative: Serious injury with multiple fractures (broken bones)   Negative: Sounds like a life-threatening emergency to the triager   Negative: Finger injury is main concern   Negative: Caused by an animal bite   Negative: Cast problems or questions   Negative: Wound looks infected   Negative: Hand pain from overuse (e.g., physical work, typing)   Negative: Hand pain not from an injury   Negative: Bullet, stabbed by knife or other serious penetrating wound   Negative: High pressure injection injury (e.g., from paint gun, usually work-related)   Negative: Looks like a broken bone or dislocated joint (crooked or deformed)   Negative: Skin is split open or gaping (length > 1/2 inch or 12 mm)   Negative: Bleeding won't stop after 10 minutes of direct pressure (using correct technique)   Negative: Dirt in the wound and not removed after 15 minutes of scrubbing   Negative: Numbness (loss of sensation) of finger(s)   Negative: Sounds like a serious injury to the triager   Negative: Looks infected (e.g., spreading redness, red streak, pus)    Answer Assessment - Initial Assessment Questions  1. MECHANISM: \"How did the injury happen?\"      Fell last night.  2. ONSET: \"When did " "the injury happen?\" (Minutes or hours ago)       Fell last night.  3. APPEARANCE of INJURY: \"What does the injury look like?\"       Right wrist is swollen. No redness.using a wrist guard. Bending hurts.  4. SEVERITY: \"Can you use the hand normally?\" \"Can you bend your fingers into a ball and then fully open them?\"       Bending hurts. Can't clap.  5. SIZE: For cuts, bruises, or swelling, ask: \"How large is it?\" (e.g., inches or centimeters;  entire hand or wrist)       Some swelling.  6. PAIN: \"Is there pain?\" If Yes, ask: \"How bad is the pain?\"  (Scale 1-10; or mild, moderate, severe)      Was bad. Now a 8/10.  7. TETANUS: For any breaks in the skin, ask: \"When was the last tetanus booster?\"      N/a  8. OTHER SYMPTOMS: \"Do you have any other symptoms?\"       Can't open a bottle.  9. PREGNANCY: \"Is there any chance you are pregnant?\" \"When was your last menstrual period?\"      N/a/    Protocols used: Hand and Wrist Injury-A-[place]    "

## 2023-11-20 NOTE — PROGRESS NOTES
SUBJECTIVE:   Chetna Villanueva is a 68 year old female presenting with a chief complaint of   Chief Complaint   Patient presents with    Urgent Care    Musculoskeletal Problem     Tripped last Saturday night and injured right wrist.        She is an established patient of [facility].  Patient, with a history of osteopenia (compression of thoracic spine), presents with right wrist pain after falling 3 days ago.  LHD.  No other injury.  Patient tripped on her robe    Treatment:  brace and ice; ibuprofen - last night last dose - 1 tab.          Review of Systems   Musculoskeletal:  Positive for arthralgias.   Skin:  Positive for color change.   All other systems reviewed and are negative.      Past Medical History:   Diagnosis Date    Basal cell carcinoma 10/09/2023    left lateral canthus, s/p MOhs surgery on this date    Cervical high risk HPV (human papillomavirus) test positive 04/06/2016    3/29/10 NIL 4/9/13 NIL 4/6/16 NIL, +HR HPV (not 16/18) 4/26/17 unsatisfactory pap, +HR HPV (not 16/18) 9/20/17 LSIL, +HR HPV (not 16/18) 10/19/17 colpo ECC neg, bx neg but suggestive of HPV effect 4/4/19 unsatisfactory pap, +HR HPV (not 16/18) 9/25/20 unsatisfactory pap, +HR HPV (not 16/18). Plan: await provider    Depression     Fracture of fourth toe, right, closed 10/04/2019    proixal phalanx, with plantar displacement    Thoracic compression fracture (H)      Family History   Problem Relation Age of Onset    Diabetes Type 2  Mother         on insulin    Dementia Mother         falls, in assisted living    Hypertension Mother     Hypertension Father     Coronary Artery Disease Father     Coronary Stenting Father     No Known Problems Brother     No Known Problems Brother     Hypertension Sister         normal stress test    Brain Cancer Sister      Current Outpatient Medications   Medication Sig Dispense Refill    amLODIPine (NORVASC) 10 MG tablet TAKE 1 TABLET (10 MG) BY MOUTH DAILY. 90 tablet 3    lisinopril (ZESTRIL) 20 MG  "tablet TAKE 1 TABLET BY MOUTH EVERY DAY 90 tablet 2     Social History     Tobacco Use    Smoking status: Former     Packs/day: .1     Types: Cigarettes     Start date: 1/1/1978    Smokeless tobacco: Never   Substance Use Topics    Alcohol use: Yes     Alcohol/week: 4.0 standard drinks of alcohol       OBJECTIVE  /80   Pulse 88   Temp 98.1  F (36.7  C) (Temporal)   Resp 16   Ht 1.556 m (5' 1.25\")   Wt 56.2 kg (124 lb)   LMP  (LMP Unknown)   SpO2 97%   BMI 23.24 kg/m      Physical Exam  Vitals and nursing note reviewed.   Constitutional:       Appearance: Normal appearance. She is normal weight.   Eyes:      Extraocular Movements: Extraocular movements intact.      Conjunctiva/sclera: Conjunctivae normal.   Cardiovascular:      Rate and Rhythm: Normal rate.   Musculoskeletal:         General: Swelling, tenderness and signs of injury present.      Comments: Right wrist:  ecchymosis at volar aspect of wrist.  Edema.  Painful ROM, particularly with supination.  Tenderness to distal radius.  Negative snuff box.  Digit ROM normal.     Skin:     Findings: Bruising present.   Neurological:      Mental Status: She is alert.         Labs:  No results found for this or any previous visit (from the past 24 hour(s)).    X-Ray was done, my findings are: No fx, interpreted by myself.  CMC djd    ASSESSMENT:      ICD-10-CM    1. Osteopenia of other site  M85.88       2. Fall, initial encounter  W19.XXXA XR Wrist Right G/E 3 Views      3. Sprain of left wrist, initial encounter  S63.502A       4. Localized primary osteoarthritis of carpometacarpal (CMC) joint of right wrist  M19.031            Medical Decision Making:    Differential Diagnosis:  MS Injury Pain: sprain and fracture    Serious Comorbid Conditions:  Adult:   reviewed    PLAN:    Last dexa scan on 9/27 and found to be osteopenia - improved.  Patient should continue with brace (cock-up splint).  OTC tylenol/motrin prn.   xrays can be radiographically " occult. If there is persistent or worsening pain, it is recommend patient return for repeat xrays in 7-10 days.    Followup:    If not improving or if condition worsens, follow up with your Primary Care Provider, If not improving or if conditions worsens over the next 12-24 hours, go to the Emergency Department    There are no Patient Instructions on file for this visit.

## 2024-04-09 ENCOUNTER — OFFICE VISIT (OUTPATIENT)
Dept: FAMILY MEDICINE | Facility: CLINIC | Age: 69
End: 2024-04-09
Payer: COMMERCIAL

## 2024-04-09 VITALS
BODY MASS INDEX: 22.82 KG/M2 | OXYGEN SATURATION: 97 % | HEART RATE: 93 BPM | HEIGHT: 62 IN | SYSTOLIC BLOOD PRESSURE: 132 MMHG | RESPIRATION RATE: 18 BRPM | WEIGHT: 124 LBS | TEMPERATURE: 98.5 F | DIASTOLIC BLOOD PRESSURE: 79 MMHG

## 2024-04-09 DIAGNOSIS — L50.3 DERMOGRAPHISM: ICD-10-CM

## 2024-04-09 DIAGNOSIS — I10 ESSENTIAL HYPERTENSION: Primary | ICD-10-CM

## 2024-04-09 PROCEDURE — 99213 OFFICE O/P EST LOW 20 MIN: CPT | Performed by: FAMILY MEDICINE

## 2024-04-09 RX ORDER — LISINOPRIL 20 MG/1
20 TABLET ORAL DAILY
Qty: 30 TABLET | Refills: 1 | Status: SHIPPED | OUTPATIENT
Start: 2024-04-09 | End: 2024-05-07

## 2024-04-09 ASSESSMENT — PAIN SCALES - GENERAL: PAINLEVEL: NO PAIN (0)

## 2024-04-09 NOTE — PROGRESS NOTES
Assessment & Plan     Essential hypertension  Previously developed a rash felt to be medication mediated by dermatologist, based on biopsy and history..  She had stopped supplements and the rash resolved.  When I last saw her September I was not clear her skin symptoms had completely resolved so I had her stop lisinopril and start amlodipine.  She has developed peripheral edema on the limb amlodipine and does not want to take it anymore.  She is not convinced that lisinopril was part of the culprit as her symptoms have largely resolved by the time she stopped lisinopril .  She would prefer to try restarting her lisinopril  - lisinopril (ZESTRIL) 20 MG tablet  Dispense: 30 tablet; Refill: 1    We discussed that if she gets a rash again will stop lisinopril and consider either beta-blocker or diuretic as a next medication.  She has heard bad things about beta-blockers from her friends and has felt hesitant about this; I did explain that everyone has different reactions and I have many patients on beta-blockers to tolerate them just fine    Dermographism  Donna had mentioned some tingly feelings in her skin.  Given her previous reaction I asked her to scratch her forearm (I also scratched).  She developed a red wheal typical of this diagnosis.  I said that while this is not a specific allergy, it does point to generally more sensitive or reactive skin.  Reviewed that people with this diagnosis may find that taking a daily antihistamine helps.  She has taken cetirizine in the past and will think about restarting it        Subjective   Chetna is a 68 year old, presenting for the following health issues:  Hypertension and Recheck Medication (Pt reports that she is here to discuss the concern of high blood [pressure readings, swelling in ankles, dizziness. Pt feels like these are troublesome for her quality of life.)      4/9/2024    11:45 AM   Additional Questions   Roomed by nicole   Accompanied by sharee          "4/9/2024    11:45 AM   Patient Reported Additional Medications   Patient reports taking the following new medications none     From my visit with Chetna 9/19/23    Dermatitis: I saw Chetna on 5/12/2023 for this and prescribed triamcinolone topical.  I was unclear about the cause of her rash as she could list at the time no new exposures.  She saw dermatology and they did a biopsy which supported the diagnosis of a drug-induced rash.  She stopped dairy, gluten, fermented foods and all medications except for Zyrtec and lisinopril.  She is taking Zyrtec twice a day and that helps.  Using Cetaphil and a refill of the cream I have given her is also helping.  [facility] [place] [facility] do patch testing.     Essential hypertension  Donna was found to have medication induced dermatitis.  She had stopped all of her vitamins and supplements was only taking lisinopril and Zyrtec.  Given that she still having some symptoms question is whether lisinopril could be a cause.  Therefore she will try stopping this and starting  -     amLODIPine (NORVASC) 10 MG tablet; Take 1 tablet (10 mg) by mouth daily     TODAY: While Chetna checks her blood pressure and it is usually in the normal range on these 6 months with amlodipine, she has developed peripheral edema and she does not want to take amlodipine anymore.    She has talked to some friends who are on beta-blockers who noted side effects, so she does not want to be on a beta-blocker either.    Though in my notes it seemed she was still having symptoms after stopping her supplements, she questions whether going off the lisinopril made a difference.  This is also consistent with her dermatologist note.  She would prefer just restart lisinopril and see how it goes.    She says she \"pees a lot,\" and this is not just little amounts of urine.  She also drinks water frequently.  She wonders about the effect of the diuretic.    She notes that she is also " "getting little headaches - annoying - and also admits on questioning her sleep is not as regular as it was before.  Reviewed information on amlodipine which does not include side effect of headaches.     Regarding skin symptoms: Has tingly prickly sometimes feelings.  She had a patient rash on her right forearm in mid January but this resolved.       History of Present Illness       Hypertension: She presents for follow up of hypertension.  She does check blood pressure  regularly outside of the clinic. Outside blood pressures have been over 140/90. She follows a low salt diet.     She eats 2-3 servings of fruits and vegetables daily.She consumes 0 sweetened beverage(s) daily.She exercises with enough effort to increase her heart rate 30 to 60 minutes per day.  She exercises with enough effort to increase her heart rate 4 days per week.   She is taking medications regularly.         Objective    /79 (BP Location: Right arm, Patient Position: Sitting, Cuff Size: Adult Regular)   Pulse 93   Temp 98.5  F (36.9  C) (Tympanic)   Resp 18   Ht 1.575 m (5' 2\")   Wt 56.2 kg (124 lb)   LMP  (LMP Unknown)   SpO2 97%   BMI 22.68 kg/m    Body mass index is 22.68 kg/m .  Physical Exam   General appearance - alert, well appearing, and in no distress  Mental status - normal mood, behavior, speech, dress, motor activity, and thought processes  Skin -given her propensity to skin symptoms, I asked her to scratch her own arm with her nail, and I did seem to my arm.  She developed a wheal and I did not.  Discussed dermographism        Signed Electronically by: Tasha Moore MD    "

## 2024-05-01 ENCOUNTER — MYC MEDICAL ADVICE (OUTPATIENT)
Dept: FAMILY MEDICINE | Facility: CLINIC | Age: 69
End: 2024-05-01
Payer: COMMERCIAL

## 2024-05-01 ENCOUNTER — MYC REFILL (OUTPATIENT)
Dept: FAMILY MEDICINE | Facility: CLINIC | Age: 69
End: 2024-05-01
Payer: COMMERCIAL

## 2024-05-01 DIAGNOSIS — I10 ESSENTIAL HYPERTENSION: ICD-10-CM

## 2024-05-01 RX ORDER — LISINOPRIL 20 MG/1
20 TABLET ORAL DAILY
OUTPATIENT
Start: 2024-05-01

## 2024-05-01 RX ORDER — LISINOPRIL 20 MG/1
20 TABLET ORAL DAILY
Qty: 30 TABLET | Refills: 1 | Status: CANCELLED | OUTPATIENT
Start: 2024-05-01

## 2024-05-01 NOTE — TELEPHONE ENCOUNTER
She DOES have a refill on file.  I had wanted to hear from her that she was tolerating lisinopril again.  She also needs a bmp before a 90 day refill. Will message her:    HI Chetna,    I got a request from your pharmacy for a 90-day refill of the lisinopril.  I do need to hear from you that you are tolerating this again -please confirm     Also with restarting this I apologize but I neglected to add that you would need kidney functions and electrolytes before your next refill just to make sure that this medication has not affected these.  Biggest concern with medications in this family is developing a high potassium level.      So I will put in an order for that test.  You can schedule a nonfasting lab only appointment at our clinic via [employer] and come in convenience.    Please let me know if you have any questions.    Tasha Moore MD

## 2024-05-03 ENCOUNTER — LAB (OUTPATIENT)
Dept: LAB | Facility: CLINIC | Age: 69
End: 2024-05-03
Payer: COMMERCIAL

## 2024-05-03 DIAGNOSIS — I10 ESSENTIAL HYPERTENSION: ICD-10-CM

## 2024-05-03 PROCEDURE — 36415 COLL VENOUS BLD VENIPUNCTURE: CPT

## 2024-05-03 PROCEDURE — 80048 BASIC METABOLIC PNL TOTAL CA: CPT

## 2024-05-04 LAB
ANION GAP SERPL CALCULATED.3IONS-SCNC: 11 MMOL/L (ref 7–15)
BUN SERPL-MCNC: 13.3 MG/DL (ref 8–23)
CALCIUM SERPL-MCNC: 9.7 MG/DL (ref 8.8–10.2)
CHLORIDE SERPL-SCNC: 107 MMOL/L (ref 98–107)
CREAT SERPL-MCNC: 0.8 MG/DL (ref 0.51–0.95)
DEPRECATED HCO3 PLAS-SCNC: 24 MMOL/L (ref 22–29)
EGFRCR SERPLBLD CKD-EPI 2021: 79 ML/MIN/1.73M2
GLUCOSE SERPL-MCNC: 106 MG/DL (ref 70–99)
POTASSIUM SERPL-SCNC: 4.3 MMOL/L (ref 3.4–5.3)
SODIUM SERPL-SCNC: 142 MMOL/L (ref 135–145)

## 2024-05-07 RX ORDER — LISINOPRIL 20 MG/1
20 TABLET ORAL DAILY
Qty: 90 TABLET | Refills: 3 | Status: SHIPPED | OUTPATIENT
Start: 2024-05-07

## 2024-05-09 ENCOUNTER — ALLIED HEALTH/NURSE VISIT (OUTPATIENT)
Dept: FAMILY MEDICINE | Facility: CLINIC | Age: 69
End: 2024-05-09
Payer: COMMERCIAL

## 2024-05-09 DIAGNOSIS — Z23 ENCOUNTER FOR IMMUNIZATION: Primary | ICD-10-CM

## 2024-05-09 PROCEDURE — 91320 SARSCV2 VAC 30MCG TRS-SUC IM: CPT

## 2024-05-09 PROCEDURE — 99207 PR NO CHARGE NURSE ONLY: CPT

## 2024-05-09 PROCEDURE — 90480 ADMN SARSCOV2 VAC 1/ONLY CMP: CPT

## 2024-05-09 NOTE — NURSING NOTE
Prior to immunization administration, verified patients identity using patient s name and date of birth. Please see Immunization Activity for additional information.     Screening Questionnaire for Adult Immunization    Are you sick today?   No   Do you have allergies to medications, food, a vaccine component or latex?   No   Have you ever had a serious reaction after receiving a vaccination?   No   Do you have a long-term health problem with heart, lung, kidney, or metabolic disease (e.g., diabetes), asthma, a blood disorder, no spleen, complement component deficiency, a cochlear implant, or a spinal fluid leak?  Are you on long-term aspirin therapy?   No   Do you have cancer, leukemia, HIV/AIDS, or any other immune system problem?   No   Do you have a parent, brother, or sister with an immune system problem?   No   In the past 3 months, have you taken medications that affect  your immune system, such as prednisone, other steroids, or anticancer drugs; drugs for the treatment of rheumatoid arthritis, Crohn s disease, or psoriasis; or have you had radiation treatments?   No   Have you had a seizure, or a brain or other nervous system problem?   No   During the past year, have you received a transfusion of blood or blood    products, or been given immune (gamma) globulin or antiviral drug?   No   For women: Are you pregnant or is there a chance you could become       pregnant during the next month?   No   Have you received any vaccinations in the past 4 weeks?   No     Immunization questionnaire answers were all negative.    I have reviewed the following standing orders:   This patient is due and qualifies for the Covid-19 vaccine.     Click here for COVID-19 Standing Order    I have reviewed the vaccines inclusion and exclusion criteria; No concerns regarding eligibility.     Patient instructed to remain in clinic for 15 minutes afterwards, and to report any adverse reactions.     Screening performed by Poppy Norris  CMA on 5/9/2024 at 1:28 PM.

## 2024-09-23 ENCOUNTER — TRANSFERRED RECORDS (OUTPATIENT)
Dept: HEALTH INFORMATION MANAGEMENT | Facility: CLINIC | Age: 69
End: 2024-09-23
Payer: COMMERCIAL

## 2024-09-25 SDOH — HEALTH STABILITY: PHYSICAL HEALTH: ON AVERAGE, HOW MANY MINUTES DO YOU ENGAGE IN EXERCISE AT THIS LEVEL?: 40 MIN

## 2024-09-25 SDOH — HEALTH STABILITY: PHYSICAL HEALTH: ON AVERAGE, HOW MANY DAYS PER WEEK DO YOU ENGAGE IN MODERATE TO STRENUOUS EXERCISE (LIKE A BRISK WALK)?: 3 DAYS

## 2024-09-25 ASSESSMENT — SOCIAL DETERMINANTS OF HEALTH (SDOH): HOW OFTEN DO YOU GET TOGETHER WITH FRIENDS OR RELATIVES?: THREE TIMES A WEEK

## 2024-09-26 PROBLEM — L50.3 DERMOGRAPHISM: Status: ACTIVE | Noted: 2024-09-26

## 2024-09-27 ENCOUNTER — OFFICE VISIT (OUTPATIENT)
Dept: FAMILY MEDICINE | Facility: CLINIC | Age: 69
End: 2024-09-27
Attending: FAMILY MEDICINE
Payer: COMMERCIAL

## 2024-09-27 VITALS
HEIGHT: 62 IN | HEART RATE: 74 BPM | RESPIRATION RATE: 16 BRPM | DIASTOLIC BLOOD PRESSURE: 74 MMHG | BODY MASS INDEX: 23.39 KG/M2 | SYSTOLIC BLOOD PRESSURE: 154 MMHG | WEIGHT: 127.1 LBS | OXYGEN SATURATION: 97 % | TEMPERATURE: 98.7 F

## 2024-09-27 DIAGNOSIS — I10 ESSENTIAL HYPERTENSION: ICD-10-CM

## 2024-09-27 DIAGNOSIS — R87.810 PAPANICOLAOU SMEAR OF CERVIX WITH POSITIVE HIGH RISK HUMAN PAPILLOMA VIRUS (HPV) TEST: ICD-10-CM

## 2024-09-27 DIAGNOSIS — Z13.220 SCREENING, LIPID: ICD-10-CM

## 2024-09-27 DIAGNOSIS — N63.25 MASS OVERLAPPING MULTIPLE QUADRANTS OF LEFT BREAST: ICD-10-CM

## 2024-09-27 DIAGNOSIS — Z00.00 ENCOUNTER FOR MEDICARE ANNUAL WELLNESS EXAM: Primary | ICD-10-CM

## 2024-09-27 LAB
CHOLEST SERPL-MCNC: 242 MG/DL
FASTING STATUS PATIENT QL REPORTED: YES
HDLC SERPL-MCNC: 55 MG/DL
LDLC SERPL CALC-MCNC: 158 MG/DL
NONHDLC SERPL-MCNC: 187 MG/DL
TRIGL SERPL-MCNC: 143 MG/DL

## 2024-09-27 PROCEDURE — 99214 OFFICE O/P EST MOD 30 MIN: CPT | Mod: 25 | Performed by: FAMILY MEDICINE

## 2024-09-27 PROCEDURE — 36415 COLL VENOUS BLD VENIPUNCTURE: CPT | Performed by: FAMILY MEDICINE

## 2024-09-27 PROCEDURE — 88142 CYTOPATH C/V THIN LAYER: CPT | Performed by: FAMILY MEDICINE

## 2024-09-27 PROCEDURE — 90480 ADMN SARSCOV2 VAC 1/ONLY CMP: CPT | Performed by: FAMILY MEDICINE

## 2024-09-27 PROCEDURE — G0439 PPPS, SUBSEQ VISIT: HCPCS | Performed by: FAMILY MEDICINE

## 2024-09-27 PROCEDURE — 80061 LIPID PANEL: CPT | Performed by: FAMILY MEDICINE

## 2024-09-27 PROCEDURE — 87624 HPV HI-RISK TYP POOLED RSLT: CPT | Mod: GZ | Performed by: FAMILY MEDICINE

## 2024-09-27 PROCEDURE — G0008 ADMIN INFLUENZA VIRUS VAC: HCPCS | Performed by: FAMILY MEDICINE

## 2024-09-27 PROCEDURE — 91320 SARSCV2 VAC 30MCG TRS-SUC IM: CPT | Performed by: FAMILY MEDICINE

## 2024-09-27 PROCEDURE — 90662 IIV NO PRSV INCREASED AG IM: CPT | Performed by: FAMILY MEDICINE

## 2024-09-27 RX ORDER — ASCORBIC ACID 500 MG
500 TABLET ORAL DAILY
COMMUNITY

## 2024-09-27 RX ORDER — CETIRIZINE HYDROCHLORIDE 10 MG/1
10 TABLET ORAL DAILY
COMMUNITY
Start: 2024-09-25

## 2024-09-27 RX ORDER — MULTIVITAMIN,THERAPEUTIC
1 TABLET ORAL DAILY
COMMUNITY

## 2024-09-27 RX ORDER — LANOLIN ALCOHOL/MO/W.PET/CERES
1000 CREAM (GRAM) TOPICAL DAILY
COMMUNITY

## 2024-09-27 RX ORDER — OMEGA-3/DHA/EPA/FISH OIL 60 MG-90MG
1 CAPSULE ORAL DAILY
COMMUNITY

## 2024-09-27 NOTE — PROGRESS NOTES
Pre[facility] Visit  [facility]  Tasha Moore MD, Family Medicine  Sep 27, 2024      Assessment & Plan     Encounter for Medicare annual wellness exam    Papanicolaou smear of cervix with positive high risk human papilloma virus (HPV) test  - HPV and Gynecologic Cytology Panel - Recommended Age 30 - 65 Years  - Gynecologic Cytology (PAP)    Hypertension   - Basic metabolic panel done I May of this year  - I can refill lisinopril when due    Mass overlapping multiple quadrants of left breast  On exam today  - US Breast Left Limited 1-3 Quadrants  - MA Diagnostic Bilateral w/Gael    - Lipid panel reflex to direct LDL Fasting    Return in about 1 year (around 9/27/2025) for medicare annual wellness, or earlier as needed.            Counseling  Appropriate preventive services were addressed with this patient via screening, questionnaire, or discussion as appropriate for fall prevention, nutrition, physical activity, , social engagement.  Checklist reviewing preventive services available has been given to the patient.Reviewed patient's diet, addressing concerns and/or questions.     Epic seems to think She is at risk for lack of exercise and has been provided her with information to increase physical activity for the benefit of her well-being. However per her report she gets a lot of exercise!     She is at risk for psychosocial distress and has been provided with information to reduce risk.           Mustapha Basilio is a 69 year old, presenting for the following:  Annual Visit and Derm Problem (F/U, [facility]. Recommended next step to go allergist.)        9/27/2024     1:18 PM   Additional Questions   Roomed by FABIANA Luis         Health Care Directive  Patient does not have a Health Care Directive or Living Will: Discussed advance care planning with patient; information given to patient to review.    HPI    Dermographism - going on for over a year . Saw  Dermatologist who  recommended visit with [place] allergy specialist. Cetirizine IS helping, but she wonders: What's in house? Mold? A marc did a meter things on my wall and it's not mold. Notes that when she would hang out at brother cabin, would go several days without skin issues, then have another urticaria outbreak    Hypertension  This morning's blood pressure 105/79  BP Readings from Last 6 Encounters:   09/27/24 (!) 154/74   04/09/24 132/79   11/20/23 132/80   09/19/23 128/71   05/12/23 132/70   11/08/22 126/78         --        9/25/2024   General Health   How would you rate your overall physical health? Good   Feel stress (tense, anxious, or unable to sleep) Only a little      (!) STRESS CONCERN      9/25/2024   Nutrition   Diet: Regular (no restrictions)            9/25/2024   Exercise   Days per week of moderate/strenuous exercise 3 days   Average minutes spent exercising at this level 40 min - zoom work up BM/BW/Fri; walks with daughter every Tuesday am 9/25/2024   Social Factors   Frequency of gathering with friends or relatives Three times a week   Worry food won't last until get money to buy more No   Food not last or not have enough money for food? No   Do you have housing? (Housing is defined as stable permanent housing and does not include staying outside in a car, in a tent, in an abandoned building, in an overnight shelter, or couch-surfing.) Yes   Are you worried about losing your housing? No   Lack of transportation? No   Unable to get utilities (heat,electricity)? No            9/25/2024   Fall Risk   Fallen 2 or more times in the past year? No    No   Trouble with walking or balance? No    No       Multiple values from one day are sorted in reverse-chronological order          9/25/2024   Activities of Daily Living- Home Safety   Needs help with the following daily activities None of the above   Safety concerns in the home None of the above            9/25/2024   Dental   Dentist  two times every year? (!) DECLINE   Has dental appt scheduled         9/25/2024   Hearing Screening   Hearing concerns? None of the above            9/25/2024   Driving Risk Screening   Patient/family members have concerns about driving No            9/25/2024   General Alertness/Fatigue Screening   Have you been more tired than usual lately? No            9/25/2024   Urinary Incontinence Screening   Bothered by leaking urine in past 6 months No            9/25/2024   TB Screening   Were you born outside of the [place]? No            Today's PHQ-2 Score:       9/27/2024     1:09 PM   PHQ-2 ( 1999 [employer])   Q1: Little interest or pleasure in doing things 0   Q2: Feeling down, depressed or hopeless 0   PHQ-2 Score 0   Q1: Little interest or pleasure in doing things Not at all   Q2: Feeling down, depressed or hopeless Not at all   PHQ-2 Score 0           9/25/2024   Substance Use   Alcohol more than 3/day or more than 7/wk No   Do you have a current opioid prescription? No   How severe/bad is pain from 1 to 10? 0/10 (No Pain)   Do you use any other substances recreationally? No        Social History     Tobacco Use    Smoking status: Former     Current packs/day: 0.10     Average packs/day: 0.1 packs/day for 46.7 years (4.7 ttl pk-yrs)     Types: Cigarettes     Start date: 1/1/1978    Smokeless tobacco: Never   Substance Use Topics    Alcohol use: Yes     Alcohol/week: 4.0 standard drinks of alcohol    Drug use: Never           9/27/2023   LAST FHS-7 RESULTS   1st degree relative breast or ovarian cancer No   Any relative bilateral breast cancer No   Any male have breast cancer No   Any ONE woman have BOTH breast AND ovarian cancer No   Any woman with breast cancer before 50yrs No   2 or more relatives with breast AND/OR ovarian cancer No   2 or more relatives with breast AND/OR bowel cancer No           Mammogram Screening - Mammogram every 1-2 years updated in Health Maintenance based on mutual decision  making      History of abnormal Pap smear: YES - reflected in Problem List and Health Maintenance accordingly        Latest Ref Rng & Units 9/19/2023     2:11 PM 9/16/2022     1:52 PM 12/9/2021     9:24 AM   PAP / HPV   PAP  Negative for Intraepithelial Lesion or Malignancy (NILM)  Negative for Intraepithelial Lesion or Malignancy (NILM)  Negative for Intraepithelial Lesion or Malignancy (NILM)    HPV 16 DNA Negative Negative  Negative  Negative    HPV 18 DNA Negative Negative  Negative  Negative    Other HR HPV Negative Positive  Positive  Positive      ASCVD Risk   The 10-year ASCVD risk score (Moise MIRANDA, et al., 2019) is: 18.7%    Values used to calculate the score:      Age: 69 years      Sex: Female      Is Non- : No      Diabetic: No      Tobacco smoker: No      Systolic Blood Pressure: 154 mmHg      Is BP treated: Yes      HDL Cholesterol: 55 mg/dL      Total Cholesterol: 242 mg/dL    Father with ASCVD - will reach out to her about today's lipid results    Reviewed and updated as needed this visit by Provider     Meds  Problems      Sexual Activity            Current providers sharing in care for this patient include:  Patient Care Team:  Tasha Moore MD as PCP - General (Family Medicine)  Tasha Moore MD as Assigned PCP    The following health maintenance items are reviewed in Epic and correct as of today:  Health Maintenance   Topic Date Due    PAP FOLLOW-UP  09/19/2024    HPV FOLLOW-UP  09/19/2024    BMP  05/03/2025    MEDICARE ANNUAL WELLNESS VISIT  09/27/2025    DEXA  09/27/2025    ANNUAL REVIEW OF HM ORDERS  09/27/2025    FALL RISK ASSESSMENT  09/27/2025    MAMMO SCREENING  09/27/2025    GLUCOSE  05/03/2027    LIPID  09/27/2029    ADVANCE CARE PLANNING  09/27/2029    DTAP/TDAP/TD IMMUNIZATION (3 - Td or Tdap) 10/04/2032    HEPATITIS C SCREENING  Completed    PHQ-2 (once per calendar year)  Completed    INFLUENZA VACCINE  Completed    Pneumococcal Vaccine: 65+  "Years  Completed    ZOSTER IMMUNIZATION  Completed    RSV VACCINE  Completed    COVID-19 Vaccine  Completed    HPV IMMUNIZATION  Aged Out    MENINGITIS IMMUNIZATION  Aged Out    RSV MONOCLONAL ANTIBODY  Aged Out    COLORECTAL CANCER SCREENING  Discontinued    LUNG CANCER SCREENING  Discontinued         Review of Systems  CONSTITUTIONAL: NEGATIVE for fever, chills, change in weight  RESP: NEGATIVE for significant cough or SOB  CV: NEGATIVE for chest pain, palpitations or peripheral edema  GI: NEGATIVE for nausea, abdominal pain, heartburn, or change in bowel habits     Objective    Exam  BP (!) 154/74 (BP Location: Left arm, Patient Position: Sitting, Cuff Size: Adult Regular)   Pulse 74   Temp 98.7  F (37.1  C) (Tympanic)   Resp 16   Ht 1.575 m (5' 2\")   Wt 57.7 kg (127 lb 1.6 oz)   LMP  (LMP Unknown)   SpO2 97%   BMI 23.25 kg/m     Estimated body mass index is 23.25 kg/m  as calculated from the following:    Height as of this encounter: 1.575 m (5' 2\").    Weight as of this encounter: 57.7 kg (127 lb 1.6 oz).    Physical Exam  General appearance - alert, well appearing, and in no distress  Mental status - normal mood, behavior, speech, dress, motor activity, and thought processes  Eyes - pupils equal and reactive, extraocular eye movements intact, funduscopic exam normal, discs flat and sharp  Ears - bilateral TM's and external ear canals normal  Mouth - mucous membranes moist, pharynx normal without lesions  Neck - supple, no significant adenopathy, carotids upstroke normal bilaterally, no bruits, thyroid exam: thyroid is normal in size without nodules or tenderness  Chest - clear to auscultation, no wheezes, rales or rhonchi, symmetric air entry  Heart - normal rate, regular rhythm, normal S1, S2, no murmurs, rubs, clicks or gallops  Abdomen - soft, nontender, nondistended, no masses or organomegaly  Breasts - breasts appear normal, no skin or nipple changes or axillary nodes. Nodularity of left breast at " 3  o'clock that is different than the right  and which I had not noted on previous exam  Neurological - alert, oriented, normal speech, no focal findings or movement disorder noted, DTR's normal and symmetric  Extremities - peripheral pulses normal, no pedal edema, no clubbing or cyanosis  Skin - no rashes or worrisome lesions            9/27/2024   Mini Cog   Clock Draw Score 2 Normal   3 Item Recall 3 objects recalled   Mini Cog Total Score 5                 Signed Electronically by: Tasha Moore MD

## 2024-09-28 NOTE — PATIENT INSTRUCTIONS
Patient Education   Preventive Care Advice   This is general advice given by our system to help you stay healthy. However, your care team may have specific advice just for you. Please talk to your care team about your preventive care needs.  Nutrition  Eat 5 or more servings of fruits and vegetables each day.  Try wheat bread, brown rice and whole grain pasta (instead of white bread, rice, and pasta).  Get enough calcium and vitamin D. Check the label on foods and aim for 100% of the RDA (recommended daily allowance).  Lifestyle  Exercise at least 150 minutes each week  (30 minutes a day, 5 days a week).  Do muscle strengthening activities 2 days a week. These help control your weight and prevent disease.  No smoking.  Wear sunscreen to prevent skin cancer.  Have a dental exam and cleaning every 6 months.  Yearly exams  See your health care team every year to talk about:  Any changes in your health.  Any medicines your care team has prescribed.  Preventive care, family planning, and ways to prevent chronic diseases.  Shots (vaccines)   HPV shots (up to age 26), if you've never had them before.  Hepatitis B shots (up to age 59), if you've never had them before.  COVID-19 shot: Get this shot when it's due.  Flu shot: Get a flu shot every year.  Tetanus shot: Get a tetanus shot every 10 years.  Pneumococcal, hepatitis A, and RSV shots: Ask your care team if you need these based on your risk.  Shingles shot (for age 50 and up)  General health tests  Diabetes screening:  Starting at age 35, Get screened for diabetes at least every 3 years.  If you are younger than age 35, ask your care team if you should be screened for diabetes.  Cholesterol test: At age 39, start having a cholesterol test every 5 years, or more often if advised.  Bone density scan (DEXA): At age 50, ask your care team if you should have this scan for osteoporosis (brittle bones).  Hepatitis C: Get tested at least once in your life.  STIs (sexually  transmitted infections)  Before age 24: Ask your care team if you should be screened for STIs.  After age 24: Get screened for STIs if you're at risk. You are at risk for STIs (including HIV) if:  You are sexually active with more than one person.  You don't use condoms every time.  You or a partner was diagnosed with a sexually transmitted infection.  If you are at risk for HIV, ask about PrEP medicine to prevent HIV.  Get tested for HIV at least once in your life, whether you are at risk for HIV or not.  Cancer screening tests  Cervical cancer screening: If you have a cervix, begin getting regular cervical cancer screening tests starting at age 21.  Breast cancer scan (mammogram): If you've ever had breasts, begin having regular mammograms starting at age 40. This is a scan to check for breast cancer.  Colon cancer screening: It is important to start screening for colon cancer at age 45.  Have a colonoscopy test every 10 years (or more often if you're at risk) Or, ask your provider about stool tests like a FIT test every year or Cologuard test every 3 years.  To learn more about your testing options, visit:   .  For help making a decision, visit:   https://bit.ly/fq61379.  Prostate cancer screening test: If you have a prostate, ask your care team if a prostate cancer screening test (PSA) at age 55 is right for you.  Lung cancer screening: If you are a current or former smoker ages 50 to 80, ask your care team if ongoing lung cancer screenings are right for you.  For informational purposes only. Not to replace the advice of your health care provider. Copyright   2023 [employer]. All rights reserved. Clinically reviewed by the [facility]itions [facility]. Kid Bunch [id number] - REV 01/24.

## 2024-10-01 LAB
HPV HR 12 DNA CVX QL NAA+PROBE: POSITIVE
HPV16 DNA CVX QL NAA+PROBE: NEGATIVE
HPV18 DNA CVX QL NAA+PROBE: NEGATIVE
HUMAN PAPILLOMA VIRUS FINAL DIAGNOSIS: ABNORMAL

## 2024-10-02 ENCOUNTER — HOSPITAL ENCOUNTER (OUTPATIENT)
Dept: MAMMOGRAPHY | Facility: CLINIC | Age: 69
Discharge: HOME | End: 2024-10-02
Attending: FAMILY MEDICINE
Payer: COMMERCIAL

## 2024-10-02 DIAGNOSIS — N63.25 MASS OVERLAPPING MULTIPLE QUADRANTS OF LEFT BREAST: ICD-10-CM

## 2024-10-02 PROCEDURE — 77066 DX MAMMO INCL CAD BI: CPT

## 2024-10-02 PROCEDURE — 76642 ULTRASOUND BREAST LIMITED: CPT | Mod: LT

## 2024-10-03 ENCOUNTER — PATIENT OUTREACH (OUTPATIENT)
Dept: FAMILY MEDICINE | Facility: CLINIC | Age: 69
End: 2024-10-03
Payer: COMMERCIAL

## 2024-10-03 LAB
BKR AP ASSOCIATED HPV REPORT: NORMAL
BKR LAB AP GYN ADEQUACY: NORMAL
BKR LAB AP GYN INTERPRETATION: NORMAL
BKR LAB AP PREVIOUS ABNL DX: NORMAL
BKR LAB AP PREVIOUS ABNORMAL: NORMAL
PATH REPORT.COMMENTS IMP SPEC: NORMAL
PATH REPORT.COMMENTS IMP SPEC: NORMAL
PATH REPORT.RELEVANT HX SPEC: NORMAL

## 2024-12-12 ENCOUNTER — OFFICE VISIT (OUTPATIENT)
Dept: OBGYN | Facility: CLINIC | Age: 69
End: 2024-12-12
Payer: COMMERCIAL

## 2024-12-12 VITALS
SYSTOLIC BLOOD PRESSURE: 138 MMHG | DIASTOLIC BLOOD PRESSURE: 74 MMHG | BODY MASS INDEX: 23.92 KG/M2 | OXYGEN SATURATION: 97 % | HEIGHT: 62 IN | HEART RATE: 67 BPM | WEIGHT: 130 LBS

## 2024-12-12 DIAGNOSIS — R87.810 CERVICAL HIGH RISK HUMAN PAPILLOMAVIRUS (HPV) DNA TEST POSITIVE: Primary | ICD-10-CM

## 2024-12-12 PROCEDURE — 57452 EXAM OF CERVIX W/SCOPE: CPT | Performed by: OBSTETRICS & GYNECOLOGY

## 2024-12-13 ENCOUNTER — TRANSFERRED RECORDS (OUTPATIENT)
Dept: HEALTH INFORMATION MANAGEMENT | Facility: CLINIC | Age: 69
End: 2024-12-13
Payer: COMMERCIAL

## 2024-12-19 ENCOUNTER — TRANSFERRED RECORDS (OUTPATIENT)
Dept: HEALTH INFORMATION MANAGEMENT | Facility: CLINIC | Age: 69
End: 2024-12-19
Payer: COMMERCIAL

## 2025-01-06 ENCOUNTER — MYC MEDICAL ADVICE (OUTPATIENT)
Dept: FAMILY MEDICINE | Facility: CLINIC | Age: 70
End: 2025-01-06
Payer: COMMERCIAL

## 2025-01-06 ENCOUNTER — PATIENT OUTREACH (OUTPATIENT)
Dept: FAMILY MEDICINE | Facility: CLINIC | Age: 70
End: 2025-01-06
Payer: COMMERCIAL

## 2025-01-06 DIAGNOSIS — E78.00 ELEVATED LDL CHOLESTEROL LEVEL: Primary | ICD-10-CM

## 2025-01-06 NOTE — TELEPHONE ENCOUNTER
12/12/24 [place] visually normal no bx's taken Plan cotest due 9/27/25 per provider   We discussed that if the results are the same or better, she does not need another colposcopy next year, but if the Pap becomes abnormal, she does.

## 2025-01-06 NOTE — PROGRESS NOTES
"Colposcopy Procedure Note    Indications: Pap smear on 9/27/24 showed NILM with +otehr HRHPV.  Pertinent past history includes the same results on 9/19/23, 9/16/22, 12/9/21, and 10/15/2020.  She had normal colposcopies on 11/8/22 and 10/15 2020.  She has a remote history of a cone biopsy in 1983, but no Pathology results are available from that.    Procedure Details   /74 (BP Location: Right arm, Patient Position: Sitting, Cuff Size: Adult Regular)   Pulse 67   Ht 1.575 m (5' 2\")   Wt 59 kg (130 lb)   LMP  (LMP Unknown)   Body mass index is 23.78 kg/m .    The reason for procedure is explained, and informed consent is obtained.      Speculum is placed in the vagina and visualization of cervix is achieved. The cervix is swabbed with 3% acetic acid solution. Transformation zone is easily seen.  Green filter is applied with no abnormal vessels seen.  Acetowhite epithelium is not seen.  ECC is not done.  Adequate colposcopy.  The patient tolerated the procedure well.    Impression: normal cervix    Plan: Post procedure instructions are reviewed.  The role of HPV in causing Pap smear abnormalities, dysplasia, and cancer is reviewed with the patient.  We discussed the role of the immune system and ways to keep it strong.  She was counseled to return to Dr Moore for a Pap with HPV testing as recommended in a year.  We discussed that if the results are the same or better, she does not need another colposcopy next year, but if the Pap becomes abnormal, she does.  "

## 2025-01-06 NOTE — TELEPHONE ENCOUNTER
Spoke with patient who states she got a notification of new test results and finally reviewed her labs from 9/27/24. States she was fasting and she would like to know what recommendations Dr Moore has for her.

## 2025-02-28 ENCOUNTER — MYC MEDICAL ADVICE (OUTPATIENT)
Dept: FAMILY MEDICINE | Facility: CLINIC | Age: 70
End: 2025-02-28
Payer: COMMERCIAL

## 2025-03-22 ENCOUNTER — HEALTH MAINTENANCE LETTER (OUTPATIENT)
Age: 70
End: 2025-03-22

## 2025-04-26 DIAGNOSIS — I10 ESSENTIAL HYPERTENSION: ICD-10-CM

## 2025-04-28 RX ORDER — LISINOPRIL 20 MG/1
20 TABLET ORAL DAILY
Qty: 90 TABLET | Refills: 0 | Status: SHIPPED | OUTPATIENT
Start: 2025-04-28

## 2025-07-02 ENCOUNTER — LAB (OUTPATIENT)
Dept: LAB | Facility: CLINIC | Age: 70
End: 2025-07-02
Payer: COMMERCIAL

## 2025-07-02 DIAGNOSIS — E78.00 ELEVATED LDL CHOLESTEROL LEVEL: ICD-10-CM

## 2025-07-02 DIAGNOSIS — I10 ESSENTIAL HYPERTENSION: Primary | ICD-10-CM

## 2025-07-02 LAB
ANION GAP SERPL CALCULATED.3IONS-SCNC: 11 MMOL/L (ref 7–15)
BUN SERPL-MCNC: 16.9 MG/DL (ref 8–23)
CALCIUM SERPL-MCNC: 9.6 MG/DL (ref 8.8–10.4)
CHLORIDE SERPL-SCNC: 106 MMOL/L (ref 98–107)
CHOLEST SERPL-MCNC: 225 MG/DL
CREAT SERPL-MCNC: 0.8 MG/DL (ref 0.51–0.95)
EGFRCR SERPLBLD CKD-EPI 2021: 78 ML/MIN/1.73M2
FASTING STATUS PATIENT QL REPORTED: YES
FASTING STATUS PATIENT QL REPORTED: YES
GLUCOSE SERPL-MCNC: 105 MG/DL (ref 70–99)
HCO3 SERPL-SCNC: 24 MMOL/L (ref 22–29)
HDLC SERPL-MCNC: 52 MG/DL
LDLC SERPL CALC-MCNC: 141 MG/DL
NONHDLC SERPL-MCNC: 173 MG/DL
POTASSIUM SERPL-SCNC: 4.2 MMOL/L (ref 3.4–5.3)
SODIUM SERPL-SCNC: 141 MMOL/L (ref 135–145)
TRIGL SERPL-MCNC: 158 MG/DL

## 2025-07-02 PROCEDURE — 80061 LIPID PANEL: CPT

## 2025-07-02 PROCEDURE — 36415 COLL VENOUS BLD VENIPUNCTURE: CPT

## 2025-07-02 PROCEDURE — 80048 BASIC METABOLIC PNL TOTAL CA: CPT

## 2025-07-03 ENCOUNTER — RESULTS FOLLOW-UP (OUTPATIENT)
Dept: FAMILY MEDICINE | Facility: CLINIC | Age: 70
End: 2025-07-03
Payer: COMMERCIAL

## 2025-07-03 DIAGNOSIS — R73.01 ELEVATED FASTING GLUCOSE: Primary | ICD-10-CM

## 2025-07-03 DIAGNOSIS — Z91.89 AT INCREASED RISK FOR CARDIOVASCULAR DISEASE: ICD-10-CM

## 2025-07-09 RX ORDER — ROSUVASTATIN CALCIUM 10 MG/1
10 TABLET, COATED ORAL DAILY
Qty: 90 TABLET | Refills: 1 | Status: SHIPPED | OUTPATIENT
Start: 2025-07-09

## 2025-07-10 ENCOUNTER — LAB (OUTPATIENT)
Dept: LAB | Facility: CLINIC | Age: 70
End: 2025-07-10
Payer: COMMERCIAL

## 2025-07-10 DIAGNOSIS — Z91.89 AT INCREASED RISK FOR CARDIOVASCULAR DISEASE: ICD-10-CM

## 2025-07-10 DIAGNOSIS — R73.01 ELEVATED FASTING GLUCOSE: ICD-10-CM

## 2025-07-10 LAB
ALBUMIN SERPL BCG-MCNC: 4.2 G/DL (ref 3.5–5.2)
ALP SERPL-CCNC: 57 U/L (ref 40–150)
ALT SERPL W P-5'-P-CCNC: 17 U/L (ref 0–50)
AST SERPL W P-5'-P-CCNC: 18 U/L (ref 0–45)
BILIRUB SERPL-MCNC: 0.4 MG/DL
BILIRUBIN DIRECT (ROCHE PRO & PURE): 0.19 MG/DL (ref 0–0.45)
EST. AVERAGE GLUCOSE BLD GHB EST-MCNC: 114 MG/DL
HBA1C MFR BLD: 5.6 % (ref 0–5.6)
PROT SERPL-MCNC: 6.9 G/DL (ref 6.4–8.3)

## 2025-07-11 ENCOUNTER — TRANSFERRED RECORDS (OUTPATIENT)
Dept: HEALTH INFORMATION MANAGEMENT | Facility: CLINIC | Age: 70
End: 2025-07-11
Payer: COMMERCIAL

## 2025-07-27 DIAGNOSIS — I10 ESSENTIAL HYPERTENSION: ICD-10-CM

## 2025-07-28 RX ORDER — LISINOPRIL 20 MG/1
20 TABLET ORAL DAILY
Qty: 90 TABLET | Refills: 0 | Status: SHIPPED | OUTPATIENT
Start: 2025-07-28